# Patient Record
Sex: MALE | Race: WHITE | NOT HISPANIC OR LATINO | Employment: OTHER | URBAN - METROPOLITAN AREA
[De-identification: names, ages, dates, MRNs, and addresses within clinical notes are randomized per-mention and may not be internally consistent; named-entity substitution may affect disease eponyms.]

---

## 2017-01-20 ENCOUNTER — TRANSCRIBE ORDERS (OUTPATIENT)
Dept: ADMINISTRATIVE | Facility: HOSPITAL | Age: 62
End: 2017-01-20

## 2017-01-20 ENCOUNTER — ALLSCRIPTS OFFICE VISIT (OUTPATIENT)
Dept: OTHER | Facility: OTHER | Age: 62
End: 2017-01-20

## 2017-01-20 DIAGNOSIS — S46.912S STRAIN OF LEFT SHOULDER, SEQUELA: Primary | ICD-10-CM

## 2017-01-30 ENCOUNTER — GENERIC CONVERSION - ENCOUNTER (OUTPATIENT)
Dept: OTHER | Facility: OTHER | Age: 62
End: 2017-01-30

## 2017-01-30 ENCOUNTER — TRANSCRIBE ORDERS (OUTPATIENT)
Dept: LAB | Facility: CLINIC | Age: 62
End: 2017-01-30

## 2017-01-30 ENCOUNTER — APPOINTMENT (OUTPATIENT)
Dept: LAB | Facility: CLINIC | Age: 62
End: 2017-01-30
Payer: COMMERCIAL

## 2017-01-30 DIAGNOSIS — E11.65 TYPE 2 DIABETES MELLITUS WITH HYPERGLYCEMIA (HCC): ICD-10-CM

## 2017-01-30 LAB
ALBUMIN SERPL BCP-MCNC: 3.5 G/DL (ref 3.5–5)
ALP SERPL-CCNC: 72 U/L (ref 46–116)
ALT SERPL W P-5'-P-CCNC: 33 U/L (ref 12–78)
ANION GAP SERPL CALCULATED.3IONS-SCNC: 8 MMOL/L (ref 4–13)
AST SERPL W P-5'-P-CCNC: 13 U/L (ref 5–45)
BILIRUB SERPL-MCNC: 0.9 MG/DL (ref 0.2–1)
BUN SERPL-MCNC: 54 MG/DL (ref 5–25)
CALCIUM SERPL-MCNC: 8.9 MG/DL (ref 8.3–10.1)
CHLORIDE SERPL-SCNC: 100 MMOL/L (ref 100–108)
CHOLEST SERPL-MCNC: 72 MG/DL (ref 50–200)
CO2 SERPL-SCNC: 26 MMOL/L (ref 21–32)
CREAT SERPL-MCNC: 1.97 MG/DL (ref 0.6–1.3)
CREAT UR-MCNC: 99.1 MG/DL
ERYTHROCYTE [DISTWIDTH] IN BLOOD BY AUTOMATED COUNT: 12.3 % (ref 11.6–15.1)
EST. AVERAGE GLUCOSE BLD GHB EST-MCNC: 252 MG/DL
GFR SERPL CREATININE-BSD FRML MDRD: 34.5 ML/MIN/1.73SQ M
GLUCOSE SERPL-MCNC: 425 MG/DL (ref 65–140)
HBA1C MFR BLD: 10.4 % (ref 4.2–6.3)
HCT VFR BLD AUTO: 34.8 % (ref 36.5–49.3)
HDLC SERPL-MCNC: 43 MG/DL (ref 40–60)
HGB BLD-MCNC: 11.9 G/DL (ref 12–17)
LDLC SERPL CALC-MCNC: 3 MG/DL (ref 0–100)
MCH RBC QN AUTO: 32 PG (ref 26.8–34.3)
MCHC RBC AUTO-ENTMCNC: 34.2 G/DL (ref 31.4–37.4)
MCV RBC AUTO: 94 FL (ref 82–98)
MICROALBUMIN UR-MCNC: 31 MG/L (ref 0–20)
MICROALBUMIN/CREAT 24H UR: 31 MG/G CREATININE (ref 0–30)
PLATELET # BLD AUTO: 152 THOUSANDS/UL (ref 149–390)
PMV BLD AUTO: 11.3 FL (ref 8.9–12.7)
POTASSIUM SERPL-SCNC: 5.7 MMOL/L (ref 3.5–5.3)
PROT SERPL-MCNC: 7.1 G/DL (ref 6.4–8.2)
RBC # BLD AUTO: 3.72 MILLION/UL (ref 3.88–5.62)
SODIUM SERPL-SCNC: 134 MMOL/L (ref 136–145)
TRIGL SERPL-MCNC: 129 MG/DL
TSH SERPL DL<=0.05 MIU/L-ACNC: 1.83 UIU/ML (ref 0.36–3.74)
WBC # BLD AUTO: 5.6 THOUSAND/UL (ref 4.31–10.16)

## 2017-01-30 PROCEDURE — 80061 LIPID PANEL: CPT

## 2017-01-30 PROCEDURE — 82570 ASSAY OF URINE CREATININE: CPT

## 2017-01-30 PROCEDURE — 83036 HEMOGLOBIN GLYCOSYLATED A1C: CPT

## 2017-01-30 PROCEDURE — 84443 ASSAY THYROID STIM HORMONE: CPT

## 2017-01-30 PROCEDURE — 36415 COLL VENOUS BLD VENIPUNCTURE: CPT

## 2017-01-30 PROCEDURE — 80053 COMPREHEN METABOLIC PANEL: CPT

## 2017-01-30 PROCEDURE — 82043 UR ALBUMIN QUANTITATIVE: CPT

## 2017-01-30 PROCEDURE — 85027 COMPLETE CBC AUTOMATED: CPT

## 2017-02-04 ENCOUNTER — HOSPITAL ENCOUNTER (OUTPATIENT)
Dept: MRI IMAGING | Facility: HOSPITAL | Age: 62
Discharge: HOME | End: 2017-02-04
Attending: ORTHOPAEDIC SURGERY
Payer: COMMERCIAL

## 2017-02-04 DIAGNOSIS — S46.912S STRAIN OF LEFT SHOULDER, SEQUELA: ICD-10-CM

## 2017-02-04 PROCEDURE — 73221 MRI JOINT UPR EXTREM W/O DYE: CPT

## 2017-02-07 ENCOUNTER — ALLSCRIPTS OFFICE VISIT (OUTPATIENT)
Dept: OTHER | Facility: OTHER | Age: 62
End: 2017-02-07

## 2017-02-08 ENCOUNTER — APPOINTMENT (OUTPATIENT)
Dept: LAB | Facility: CLINIC | Age: 62
End: 2017-02-08
Payer: COMMERCIAL

## 2017-02-08 DIAGNOSIS — E78.5 HYPERLIPIDEMIA: ICD-10-CM

## 2017-02-08 LAB
ALBUMIN SERPL BCP-MCNC: 3.6 G/DL (ref 3.5–5)
ALP SERPL-CCNC: 70 U/L (ref 46–116)
ALT SERPL W P-5'-P-CCNC: 36 U/L (ref 12–78)
ANION GAP SERPL CALCULATED.3IONS-SCNC: 8 MMOL/L (ref 4–13)
AST SERPL W P-5'-P-CCNC: 17 U/L (ref 5–45)
BILIRUB SERPL-MCNC: 1.4 MG/DL (ref 0.2–1)
BUN SERPL-MCNC: 45 MG/DL (ref 5–25)
CALCIUM SERPL-MCNC: 9.4 MG/DL (ref 8.3–10.1)
CHLORIDE SERPL-SCNC: 101 MMOL/L (ref 100–108)
CO2 SERPL-SCNC: 28 MMOL/L (ref 21–32)
CREAT SERPL-MCNC: 1.77 MG/DL (ref 0.6–1.3)
GFR SERPL CREATININE-BSD FRML MDRD: 39 ML/MIN/1.73SQ M
GLUCOSE SERPL-MCNC: 256 MG/DL (ref 65–140)
POTASSIUM SERPL-SCNC: 4.9 MMOL/L (ref 3.5–5.3)
PROT SERPL-MCNC: 7 G/DL (ref 6.4–8.2)
SODIUM SERPL-SCNC: 137 MMOL/L (ref 136–145)

## 2017-02-08 PROCEDURE — 80053 COMPREHEN METABOLIC PANEL: CPT

## 2017-02-08 PROCEDURE — 36415 COLL VENOUS BLD VENIPUNCTURE: CPT

## 2017-02-10 ENCOUNTER — ALLSCRIPTS OFFICE VISIT (OUTPATIENT)
Dept: OTHER | Facility: OTHER | Age: 62
End: 2017-02-10

## 2017-02-10 DIAGNOSIS — M75.22 BICIPITAL TENDINITIS OF LEFT SHOULDER: ICD-10-CM

## 2017-02-10 DIAGNOSIS — M75.52 BURSITIS OF LEFT SHOULDER: ICD-10-CM

## 2017-02-10 DIAGNOSIS — R68.83 CHILLS (WITHOUT FEVER): ICD-10-CM

## 2017-02-14 ENCOUNTER — GENERIC CONVERSION - ENCOUNTER (OUTPATIENT)
Dept: OTHER | Facility: OTHER | Age: 62
End: 2017-02-14

## 2017-02-21 ENCOUNTER — HOSPITAL ENCOUNTER (OUTPATIENT)
Dept: RADIOLOGY | Age: 62
Discharge: HOME | End: 2017-02-21
Attending: NURSE PRACTITIONER | Admitting: NURSE PRACTITIONER
Payer: COMMERCIAL

## 2017-02-21 ENCOUNTER — APPOINTMENT (OUTPATIENT)
Dept: LAB | Age: 62
End: 2017-02-21
Attending: NURSE PRACTITIONER
Payer: COMMERCIAL

## 2017-02-21 ENCOUNTER — OFFICE VISIT (OUTPATIENT)
Dept: URGENT CARE | Age: 62
End: 2017-02-21
Payer: COMMERCIAL

## 2017-02-21 DIAGNOSIS — R68.83 CHILLS (WITHOUT FEVER): ICD-10-CM

## 2017-02-21 LAB — GLUCOSE SERPL-MCNC: 95 MG/DL (ref 65–140)

## 2017-02-21 PROCEDURE — 99213 OFFICE O/P EST LOW 20 MIN: CPT

## 2017-02-21 PROCEDURE — 87798 DETECT AGENT NOS DNA AMP: CPT

## 2017-02-21 PROCEDURE — 87070 CULTURE OTHR SPECIMN AEROBIC: CPT

## 2017-02-21 PROCEDURE — 87086 URINE CULTURE/COLONY COUNT: CPT

## 2017-02-21 PROCEDURE — 87430 STREP A AG IA: CPT | Performed by: FAMILY MEDICINE

## 2017-02-21 PROCEDURE — 82948 REAGENT STRIP/BLOOD GLUCOSE: CPT

## 2017-02-21 PROCEDURE — 71020 HB CHEST X-RAY 2VW FRONTAL&LATL: CPT

## 2017-02-22 LAB
BACTERIA UR CULT: NORMAL
FLUAV AG SPEC QL: NORMAL
FLUBV AG SPEC QL: NORMAL
RSV B RNA SPEC QL NAA+PROBE: NORMAL

## 2017-02-23 LAB — BACTERIA THROAT CULT: NORMAL

## 2017-03-08 ENCOUNTER — ALLSCRIPTS OFFICE VISIT (OUTPATIENT)
Dept: OTHER | Facility: OTHER | Age: 62
End: 2017-03-08

## 2017-03-24 ENCOUNTER — GENERIC CONVERSION - ENCOUNTER (OUTPATIENT)
Dept: OTHER | Facility: OTHER | Age: 62
End: 2017-03-24

## 2017-04-10 ENCOUNTER — ALLSCRIPTS OFFICE VISIT (OUTPATIENT)
Dept: OTHER | Facility: OTHER | Age: 62
End: 2017-04-10

## 2017-04-19 ENCOUNTER — ALLSCRIPTS OFFICE VISIT (OUTPATIENT)
Dept: OTHER | Facility: OTHER | Age: 62
End: 2017-04-19

## 2017-07-07 ENCOUNTER — GENERIC CONVERSION - ENCOUNTER (OUTPATIENT)
Dept: OTHER | Facility: OTHER | Age: 62
End: 2017-07-07

## 2017-07-28 ENCOUNTER — TRANSCRIBE ORDERS (OUTPATIENT)
Dept: LAB | Facility: CLINIC | Age: 62
End: 2017-07-28

## 2017-07-28 ENCOUNTER — APPOINTMENT (OUTPATIENT)
Dept: LAB | Facility: CLINIC | Age: 62
End: 2017-07-28
Payer: COMMERCIAL

## 2017-07-28 DIAGNOSIS — H35.372 LEFT EPIRETINAL MEMBRANE: ICD-10-CM

## 2017-07-28 DIAGNOSIS — H43.12 VITREOUS HEMORRHAGE OF LEFT EYE (HCC): ICD-10-CM

## 2017-07-28 DIAGNOSIS — H43.12 VITREOUS HEMORRHAGE OF LEFT EYE (HCC): Primary | ICD-10-CM

## 2017-07-28 LAB
ANION GAP SERPL CALCULATED.3IONS-SCNC: 9 MMOL/L (ref 4–13)
APTT PPP: 25 SECONDS (ref 23–35)
BASOPHILS # BLD AUTO: 0.03 THOUSANDS/ΜL (ref 0–0.1)
BASOPHILS NFR BLD AUTO: 0 % (ref 0–1)
BUN SERPL-MCNC: 45 MG/DL (ref 5–25)
CALCIUM SERPL-MCNC: 9.5 MG/DL (ref 8.3–10.1)
CHLORIDE SERPL-SCNC: 104 MMOL/L (ref 100–108)
CO2 SERPL-SCNC: 30 MMOL/L (ref 21–32)
CREAT SERPL-MCNC: 1.84 MG/DL (ref 0.6–1.3)
EOSINOPHIL # BLD AUTO: 0.19 THOUSAND/ΜL (ref 0–0.61)
EOSINOPHIL NFR BLD AUTO: 2 % (ref 0–6)
ERYTHROCYTE [DISTWIDTH] IN BLOOD BY AUTOMATED COUNT: 12.7 % (ref 11.6–15.1)
GFR SERPL CREATININE-BSD FRML MDRD: 38 ML/MIN/1.73SQ M
GLUCOSE SERPL-MCNC: 111 MG/DL (ref 65–140)
HCT VFR BLD AUTO: 39.6 % (ref 36.5–49.3)
HGB BLD-MCNC: 13.2 G/DL (ref 12–17)
INR PPP: 0.94 (ref 0.86–1.16)
LYMPHOCYTES # BLD AUTO: 1.88 THOUSANDS/ΜL (ref 0.6–4.47)
LYMPHOCYTES NFR BLD AUTO: 24 % (ref 14–44)
MCH RBC QN AUTO: 30.3 PG (ref 26.8–34.3)
MCHC RBC AUTO-ENTMCNC: 33.3 G/DL (ref 31.4–37.4)
MCV RBC AUTO: 91 FL (ref 82–98)
MONOCYTES # BLD AUTO: 0.73 THOUSAND/ΜL (ref 0.17–1.22)
MONOCYTES NFR BLD AUTO: 9 % (ref 4–12)
NEUTROPHILS # BLD AUTO: 5.08 THOUSANDS/ΜL (ref 1.85–7.62)
NEUTS SEG NFR BLD AUTO: 65 % (ref 43–75)
PLATELET # BLD AUTO: 244 THOUSANDS/UL (ref 149–390)
PMV BLD AUTO: 10.8 FL (ref 8.9–12.7)
POTASSIUM SERPL-SCNC: 4.8 MMOL/L (ref 3.5–5.3)
PROTHROMBIN TIME: 12.8 SECONDS (ref 12.1–14.4)
RBC # BLD AUTO: 4.35 MILLION/UL (ref 3.88–5.62)
SODIUM SERPL-SCNC: 143 MMOL/L (ref 136–145)
WBC # BLD AUTO: 7.91 THOUSAND/UL (ref 4.31–10.16)

## 2017-07-28 PROCEDURE — 85730 THROMBOPLASTIN TIME PARTIAL: CPT

## 2017-07-28 PROCEDURE — 80048 BASIC METABOLIC PNL TOTAL CA: CPT

## 2017-07-28 PROCEDURE — 85025 COMPLETE CBC W/AUTO DIFF WBC: CPT

## 2017-07-28 PROCEDURE — 85610 PROTHROMBIN TIME: CPT

## 2017-07-28 PROCEDURE — 36415 COLL VENOUS BLD VENIPUNCTURE: CPT

## 2017-10-17 ENCOUNTER — GENERIC CONVERSION - ENCOUNTER (OUTPATIENT)
Dept: OTHER | Facility: OTHER | Age: 62
End: 2017-10-17

## 2018-01-11 NOTE — RESULT NOTES
Verified Results  (1) COMPREHENSIVE METABOLIC PANEL [id number] 81:44YI Silvino Espinoza Order Number: [id number]     Test Name Result Flag Reference   GLUCOSE,RANDM 256 mg/dL H    If the patient is fasting, the ADA then defines impaired fasting glucose as > 100 mg/dL and diabetes as > or equal to 123 mg/dL  SODIUM 137 mmol/L  136-145   POTASSIUM 4 9 mmol/L  3 5-5 3   CHLORIDE 101 mmol/L  100-108   CARBON DIOXIDE 28 mmol/L  21-32   ANION GAP (CALC) 8 mmol/L  4-13   BLOOD UREA NITROGEN 45 mg/dL H 5-25   CREATININE 1 77 mg/dL H 0 60-1 30   Standardized to IDMS reference method   CALCIUM 9 4 mg/dL  8 3-10 1   BILI, TOTAL 1 40 mg/dL H 0 20-1 00   ALK PHOSPHATAS 70 U/L     ALT (SGPT) 36 U/L  12-78   AST(SGOT) 17 U/L  5-45   ALBUMIN 3 6 g/dL  3 5-5 0   TOTAL PROTEIN 7 0 g/dL  6 4-8 2   eGFR Non-African American 39 0 ml/min/1 73sq [facility] [employer] Disease Education Program recommendations are as follows:  GFR calculation is accurate only with a steady state creatinine  Chronic Kidney disease less than 60 ml/min/1 73 sq  meters  Kidney failure less than 15 ml/min/1 73 sq  meters         Discussion/Summary   stable, improved from last lab

## 2018-01-11 NOTE — RESULT NOTES
Message   i  left a message for patient to call back, his potassium is 5 7 and blood sugar 425, he should go to the ER as soon as possible, if he calls back please tell him to go to the ER,    and also please call him again  thanks      Verified Results  (1) CBC/ PLT (NO DIFF) [id number] 07:30AM Jana Rough Order Number: [id number]     Test Name Result Flag Reference   HEMATOCRIT 34 8 % L 36 5-49 3   HEMOGLOBIN 11 9 g/dL L 12 0-17 0   MCHC 34 2 g/dL  31 4-37 4   MCH 32 0 pg  26 8-34 3   MCV 94 fL  82-98   PLATELET COUNT 817 Thousands/uL  149-390   RBC COUNT 3 72 Million/uL L 3 88-5 62   RDW 12 3 %  11 6-15 1   WBC COUNT 5 60 Thousand/uL  4 31-10 16   MPV 11 3 fL  8 9-12 7     (1) COMPREHENSIVE METABOLIC PANEL [id number] 23:10PN Jana Rough Order Number: [id number]     Test Name Result Flag Reference   GLUCOSE,RANDM 425 mg/dL H    If the patient is fasting, the ADA then defines impaired fasting glucose as > 100 mg/dL and diabetes as > or equal to 123 mg/dL  SODIUM 134 mmol/L L 136-145   POTASSIUM 5 7 mmol/L H 3 5-5 3   CHLORIDE 100 mmol/L  100-108   CARBON DIOXIDE 26 mmol/L  21-32   ANION GAP (CALC) 8 mmol/L  4-13   BLOOD UREA NITROGEN 54 mg/dL H 5-25   CREATININE 1 97 mg/dL H 0 60-1 30   Standardized to IDMS reference method   CALCIUM 8 9 mg/dL  8 3-10 1   BILI, TOTAL 0 90 mg/dL  0 20-1 00   ALK PHOSPHATAS 72 U/L     ALT (SGPT) 33 U/L  12-78   AST(SGOT) 13 U/L  5-45   ALBUMIN 3 5 g/dL  3 5-5 0   TOTAL PROTEIN 7 1 g/dL  6 4-8 2   eGFR Non-African American 34 5 ml/min/1 73sq m     - Patient Instructions: This is a fasting blood test  Water,black tea or black  coffee only after 9:00pm the night before test Drink 2 glasses of water the morning of test - Patient Instructions: This bloodwork is non-fasting  Please drink two glasses of   water morning of bloodwork    National Kidney Disease Education Program recommendations are as follows:  GFR calculation is accurate only with a steady state creatinine  Chronic Kidney disease less than 60 ml/min/1 73 sq  meters  Kidney failure less than 15 ml/min/1 73 sq  meters  (1) HEMOGLOBIN A1C 30Jan2017 07:30AM Jesús Hu Order Number: [id number]     Test Name Result Flag Reference   HEMOGLOBIN A1C 10 4 % H 4 2-6 3   EST  AVG  GLUCOSE 252 mg/dl       (1) LIPID PANEL, FASTING 30Jan2017 07:30AM Jesús Hu Order Number: [id number]     Test Name Result Flag Reference   CHOLESTEROL 72 mg/dL     HDL,DIRECT 43 mg/dL  40-60   Specimen collection should occur prior to Metamizole administration due to the potential for falsely depressed results  LDL CHOLESTEROL CALCULATED 3 mg/dL  0-100   - Patient Instructions: This is a fasting blood test  Water,black tea or black  coffee only after 9:00pm the night before test   Drink 2 glasses of water the morning of test     - Patient Instructions: This is a fasting blood test  Water,black tea or black  coffee only after 9:00pm the night before test Drink 2 glasses of water the morning of test - Patient Instructions: This bloodwork is non-fasting  Please drink two glasses of   water morning of bloodwork  Triglyceride:         Normal              <150 mg/dl       Borderline High    150-199 mg/dl       High               200-499 mg/dl       Very High          >499 mg/dl  Cholesterol:         Desirable        <200 mg/dl      Borderline High  200-239 mg/dl      High             >239 mg/dl  HDL Cholesterol:        High    >59 mg/dL      Low     <41 mg/dL  LDL CALCULATED:    This screening LDL is a calculated result  It does not have the accuracy of the Direct Measured LDL in the monitoring of patients with hyperlipidemia and/or statin therapy  Direct Measure LDL (DTC532) must be ordered separately in these patients     TRIGLYCERIDES 129 mg/dL  <=150   Specimen collection should occur prior to N-Acetylcysteine or Metamizole administration due to the potential for falsely depressed results  (1) MICROALBUMIN CREATININE RATIO, RANDOM URINE 30Jan2017 07:30AM Boond Order Number: [id number]     Test Name Result Flag Reference   MICROALBUMIN/ CREAT R 31 mg/g creatinine H 0-30   MICROALBUMIN,URINE 31 0 mg/L H 0 0-20 0   CREATININE URINE 99 1 mg/dL       (1) TSH [id number] 07:30AM Boond Order Number: [id number]     Test Name Result Flag Reference   TSH 1 830 uIU/mL  0 358-3 740   - Patient Instructions: This bloodwork is non-fasting  Please drink two glasses of water morning of bloodwork  - Patient Instructions: This is a fasting blood test  Water,black tea or black  coffee only after 9:00pm the night before test Drink 2 glasses of water the morning of test - Patient Instructions: This bloodwork is non-fasting  Please drink two glasses of   water morning of bloodwork  Patients undergoing fluorescein dye angiography may retain small amounts of fluorescein in the body for 48-72 hours post procedure  Samples containing fluorescein can produce falsely depressed TSH values  If the patient had this procedure,a specimen should be resubmitted post fluorescein clearance

## 2018-01-12 VITALS
HEIGHT: 68 IN | DIASTOLIC BLOOD PRESSURE: 58 MMHG | SYSTOLIC BLOOD PRESSURE: 97 MMHG | BODY MASS INDEX: 26.67 KG/M2 | HEART RATE: 69 BPM | WEIGHT: 176 LBS

## 2018-01-13 VITALS
HEART RATE: 73 BPM | BODY MASS INDEX: 26.67 KG/M2 | WEIGHT: 176 LBS | HEIGHT: 68 IN | RESPIRATION RATE: 16 BRPM | SYSTOLIC BLOOD PRESSURE: 110 MMHG | DIASTOLIC BLOOD PRESSURE: 68 MMHG

## 2018-01-13 VITALS
HEART RATE: 77 BPM | WEIGHT: 183 LBS | SYSTOLIC BLOOD PRESSURE: 112 MMHG | BODY MASS INDEX: 27.74 KG/M2 | HEIGHT: 68 IN | DIASTOLIC BLOOD PRESSURE: 64 MMHG

## 2018-01-13 VITALS
HEART RATE: 85 BPM | SYSTOLIC BLOOD PRESSURE: 111 MMHG | DIASTOLIC BLOOD PRESSURE: 66 MMHG | WEIGHT: 176 LBS | BODY MASS INDEX: 26.67 KG/M2 | HEIGHT: 68 IN

## 2018-01-14 VITALS
WEIGHT: 177 LBS | HEART RATE: 69 BPM | SYSTOLIC BLOOD PRESSURE: 103 MMHG | HEIGHT: 68 IN | DIASTOLIC BLOOD PRESSURE: 60 MMHG | BODY MASS INDEX: 26.83 KG/M2

## 2018-01-14 VITALS
SYSTOLIC BLOOD PRESSURE: 120 MMHG | BODY MASS INDEX: 28.04 KG/M2 | WEIGHT: 185 LBS | HEIGHT: 68 IN | DIASTOLIC BLOOD PRESSURE: 64 MMHG | HEART RATE: 61 BPM
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
## 2018-01-18 NOTE — RESULT NOTES
Verified Results  VAS KYRA WITH EXERCISE STUDY 01WYC5772 03:20PM Ángela Swan Order Number: [id number]    - Patient Instructions: To schedule this appointment, please contact Central Scheduling at [phone]   Order Number: [id number]    - Patient Instructions: To schedule this appointment, please contact Central Scheduling at [phone]  Test Name Result Flag Reference   VAS KYRA WITH EXERCISE STUDY (Report)     [facility] REPORT   CLINICAL:   Indications: PVD, Unspecified [I73 9]  Atherosclerosis with Claudication   [I70 219]  Patient complains of bilateral leg claudication  He stated that it   feels like he is being squeezed  1  CAD S/P percutaneous coronary angioplasty (414 01,V45 82) (I25 10,Z98 61)   2  Chronic kidney disease, stage 3 (585 3) (N18 3)   3  Benign essential hypertension (401 1) (I10)   4  Chronic systolic CHF (congestive heart failure), NYHA class 1 (428 22,428 0)   (I50 22)   5  Chronic combined systolic and diastolic CHF (congestive heart failure)   (428 42,428 0)   (I50 42)   6  Diabetes mellitus type II, uncontrolled (250 02) (E11 65)   7  Diabetic nephropathy associated with type 2 diabetes mellitus   (250 40,583 81) (E11 21)   8  Altered sensation of foot (782 0) (R20 9)   Operative History   Angioplasty   Risk Factors: The patient has history of HTN, diabetes (diabetes), renal disease   and CAD  Clinical   Right Pressure: 135/ mm Hg, Left Pressure: 117/ mm Hg  FINDINGS:      Segment    Right      Left               Pressure  KYRA Pressure  KYRA    Ant  Tibial    163 1 21    142 1 05    Post  Tibial    173 1 28    150 1 11    Metatarsal     255 1 89    120 0 89    Great Toe      84 0 62    61 0 45             CONCLUSION:   Impression:   Exercise: Toe raises   Duration: Completed 1 minute 30 seconds and was terminated due to patient   tiredness  Right Lower Limb   The ankle brachial index is normal at rest and with exercise     Ankle Pressure: 173 mm Hg , KYRA: 1 28  Immediately post exercise: 0 98   Right Metatarsal Pressure: 255 mm Hg   Right Great Toe Pressure 84 mm Hg , within the healing range  Left Lower Limb   The ankle brachial index is normal at rest and with exercise  Ankle Pressure 150 mm Hg , KYRA: 1 11 Immediately post exercise: 0 95   Left Metatarsal Pressure: 120 mm Hg  Left Great Toe Pressure 61 mm Hg , within the healing range        SIGNATURE:   Electronically Signed by: Zulema Sutton MD, RPVI on 2016-11-12 12:27:46 PM       Discussion/Summary   normal report
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
## 2018-02-23 ENCOUNTER — TELEPHONE (OUTPATIENT)
Dept: CARDIOLOGY CLINIC | Facility: CLINIC | Age: 63
End: 2018-02-23

## 2018-02-23 NOTE — TELEPHONE ENCOUNTER
Pre-op completed for [place] eye surgery @ [facility] faxed to [phone] and JESSICA

## 2018-06-12 ENCOUNTER — HOSPITAL ENCOUNTER (INPATIENT)
Facility: HOSPITAL | Age: 63
LOS: 2 days | Discharge: HOME HEALTH | DRG: 291 | End: 2018-06-14
Attending: EMERGENCY MEDICINE | Admitting: INTERNAL MEDICINE
Payer: COMMERCIAL

## 2018-06-12 ENCOUNTER — APPOINTMENT (EMERGENCY)
Dept: RADIOLOGY | Facility: HOSPITAL | Age: 63
DRG: 291 | End: 2018-06-12
Payer: COMMERCIAL

## 2018-06-12 ENCOUNTER — APPOINTMENT (INPATIENT)
Dept: ULTRASOUND IMAGING | Facility: HOSPITAL | Age: 63
DRG: 291 | End: 2018-06-12
Payer: COMMERCIAL

## 2018-06-12 DIAGNOSIS — L03.116 CELLULITIS OF LEFT LEG: ICD-10-CM

## 2018-06-12 DIAGNOSIS — N18.9 ACUTE KIDNEY INJURY SUPERIMPOSED ON CHRONIC KIDNEY DISEASE (HCC): Chronic | ICD-10-CM

## 2018-06-12 DIAGNOSIS — L03.116 LEFT LEG CELLULITIS: ICD-10-CM

## 2018-06-12 DIAGNOSIS — N17.9 ACUTE KIDNEY INJURY SUPERIMPOSED ON CHRONIC KIDNEY DISEASE (HCC): Chronic | ICD-10-CM

## 2018-06-12 DIAGNOSIS — E78.5 HYPERLIPIDEMIA: Chronic | ICD-10-CM

## 2018-06-12 DIAGNOSIS — R07.9 CHEST PAIN: ICD-10-CM

## 2018-06-12 DIAGNOSIS — I50.9 CHF EXACERBATION (HCC): Primary | ICD-10-CM

## 2018-06-12 DIAGNOSIS — I50.43 ACUTE ON CHRONIC COMBINED SYSTOLIC AND DIASTOLIC CHF (CONGESTIVE HEART FAILURE) (HCC): ICD-10-CM

## 2018-06-12 PROBLEM — K21.9 GERD (GASTROESOPHAGEAL REFLUX DISEASE): Status: ACTIVE | Noted: 2018-06-12

## 2018-06-12 LAB
ALBUMIN SERPL BCP-MCNC: 3.6 G/DL (ref 3.5–5)
ALP SERPL-CCNC: 86 U/L (ref 46–116)
ALT SERPL W P-5'-P-CCNC: 33 U/L (ref 12–78)
ANION GAP SERPL CALCULATED.3IONS-SCNC: 11 MMOL/L (ref 4–13)
APTT PPP: 30 SECONDS (ref 24–36)
AST SERPL W P-5'-P-CCNC: 19 U/L (ref 5–45)
BASOPHILS # BLD AUTO: 0.05 THOUSANDS/ΜL (ref 0–0.1)
BASOPHILS NFR BLD AUTO: 1 % (ref 0–1)
BILIRUB SERPL-MCNC: 1.2 MG/DL (ref 0.2–1)
BUN SERPL-MCNC: 39 MG/DL (ref 5–25)
CALCIUM SERPL-MCNC: 9.1 MG/DL (ref 8.3–10.1)
CHLORIDE SERPL-SCNC: 104 MMOL/L (ref 100–108)
CO2 SERPL-SCNC: 24 MMOL/L (ref 21–32)
CREAT SERPL-MCNC: 2.01 MG/DL (ref 0.6–1.3)
EOSINOPHIL # BLD AUTO: 0.2 THOUSAND/ΜL (ref 0–0.61)
EOSINOPHIL NFR BLD AUTO: 3 % (ref 0–6)
ERYTHROCYTE [DISTWIDTH] IN BLOOD BY AUTOMATED COUNT: 14.7 % (ref 11.6–15.1)
GFR SERPL CREATININE-BSD FRML MDRD: 34 ML/MIN/1.73SQ M
GLUCOSE SERPL-MCNC: 104 MG/DL (ref 65–140)
HCT VFR BLD AUTO: 35 % (ref 36.5–49.3)
HGB BLD-MCNC: 11.7 G/DL (ref 12–17)
INR PPP: 0.97 (ref 0.86–1.17)
LACTATE SERPL-SCNC: 2.6 MMOL/L (ref 0.5–2)
LYMPHOCYTES # BLD AUTO: 1.13 THOUSANDS/ΜL (ref 0.6–4.47)
LYMPHOCYTES NFR BLD AUTO: 14 % (ref 14–44)
MCH RBC QN AUTO: 31 PG (ref 26.8–34.3)
MCHC RBC AUTO-ENTMCNC: 33.4 G/DL (ref 31.4–37.4)
MCV RBC AUTO: 93 FL (ref 82–98)
MONOCYTES # BLD AUTO: 0.71 THOUSAND/ΜL (ref 0.17–1.22)
MONOCYTES NFR BLD AUTO: 9 % (ref 4–12)
NEUTROPHILS # BLD AUTO: 5.98 THOUSANDS/ΜL (ref 1.85–7.62)
NEUTS SEG NFR BLD AUTO: 74 % (ref 43–75)
NT-PROBNP SERPL-MCNC: 4974 PG/ML
PLATELET # BLD AUTO: 232 THOUSANDS/UL (ref 149–390)
PLATELET # BLD AUTO: 263 THOUSANDS/UL (ref 149–390)
PMV BLD AUTO: 10.2 FL (ref 8.9–12.7)
PMV BLD AUTO: 9.8 FL (ref 8.9–12.7)
POTASSIUM SERPL-SCNC: 4.1 MMOL/L (ref 3.5–5.3)
PROT SERPL-MCNC: 8.5 G/DL (ref 6.4–8.2)
PROTHROMBIN TIME: 12.6 SECONDS (ref 11.8–14.2)
RBC # BLD AUTO: 3.78 MILLION/UL (ref 3.88–5.62)
SODIUM SERPL-SCNC: 139 MMOL/L (ref 136–145)
TROPONIN I SERPL-MCNC: <0.02 NG/ML
TROPONIN I SERPL-MCNC: <0.02 NG/ML
WBC # BLD AUTO: 8.07 THOUSAND/UL (ref 4.31–10.16)

## 2018-06-12 PROCEDURE — 83880 ASSAY OF NATRIURETIC PEPTIDE: CPT | Performed by: EMERGENCY MEDICINE

## 2018-06-12 PROCEDURE — 76705 ECHO EXAM OF ABDOMEN: CPT

## 2018-06-12 PROCEDURE — 80053 COMPREHEN METABOLIC PANEL: CPT | Performed by: EMERGENCY MEDICINE

## 2018-06-12 PROCEDURE — 93005 ELECTROCARDIOGRAM TRACING: CPT

## 2018-06-12 PROCEDURE — 85025 COMPLETE CBC W/AUTO DIFF WBC: CPT | Performed by: EMERGENCY MEDICINE

## 2018-06-12 PROCEDURE — 87186 SC STD MICRODIL/AGAR DIL: CPT | Performed by: EMERGENCY MEDICINE

## 2018-06-12 PROCEDURE — 87040 BLOOD CULTURE FOR BACTERIA: CPT | Performed by: EMERGENCY MEDICINE

## 2018-06-12 PROCEDURE — 71046 X-RAY EXAM CHEST 2 VIEWS: CPT

## 2018-06-12 PROCEDURE — 87205 SMEAR GRAM STAIN: CPT | Performed by: EMERGENCY MEDICINE

## 2018-06-12 PROCEDURE — 83605 ASSAY OF LACTIC ACID: CPT | Performed by: EMERGENCY MEDICINE

## 2018-06-12 PROCEDURE — 99285 EMERGENCY DEPT VISIT HI MDM: CPT

## 2018-06-12 PROCEDURE — 87070 CULTURE OTHR SPECIMN AEROBIC: CPT | Performed by: EMERGENCY MEDICINE

## 2018-06-12 PROCEDURE — 99223 1ST HOSP IP/OBS HIGH 75: CPT | Performed by: INTERNAL MEDICINE

## 2018-06-12 PROCEDURE — 87077 CULTURE AEROBIC IDENTIFY: CPT | Performed by: EMERGENCY MEDICINE

## 2018-06-12 PROCEDURE — 85610 PROTHROMBIN TIME: CPT | Performed by: EMERGENCY MEDICINE

## 2018-06-12 PROCEDURE — 84484 ASSAY OF TROPONIN QUANT: CPT | Performed by: PHYSICIAN ASSISTANT

## 2018-06-12 PROCEDURE — 96365 THER/PROPH/DIAG IV INF INIT: CPT

## 2018-06-12 PROCEDURE — 85730 THROMBOPLASTIN TIME PARTIAL: CPT | Performed by: EMERGENCY MEDICINE

## 2018-06-12 PROCEDURE — 36415 COLL VENOUS BLD VENIPUNCTURE: CPT | Performed by: EMERGENCY MEDICINE

## 2018-06-12 PROCEDURE — 84484 ASSAY OF TROPONIN QUANT: CPT | Performed by: EMERGENCY MEDICINE

## 2018-06-12 PROCEDURE — 85049 AUTOMATED PLATELET COUNT: CPT | Performed by: PHYSICIAN ASSISTANT

## 2018-06-12 PROCEDURE — 96375 TX/PRO/DX INJ NEW DRUG ADDON: CPT

## 2018-06-12 RX ORDER — CALCIUM CARBONATE 200(500)MG
1000 TABLET,CHEWABLE ORAL DAILY PRN
Status: DISCONTINUED | OUTPATIENT
Start: 2018-06-12 | End: 2018-06-14 | Stop reason: HOSPADM

## 2018-06-12 RX ORDER — AMLODIPINE BESYLATE 5 MG/1
5 TABLET ORAL DAILY
Status: DISCONTINUED | OUTPATIENT
Start: 2018-06-13 | End: 2018-06-14 | Stop reason: HOSPADM

## 2018-06-12 RX ORDER — ONDANSETRON 2 MG/ML
4 INJECTION INTRAMUSCULAR; INTRAVENOUS EVERY 6 HOURS PRN
Status: DISCONTINUED | OUTPATIENT
Start: 2018-06-12 | End: 2018-06-14 | Stop reason: HOSPADM

## 2018-06-12 RX ORDER — FERROUS SULFATE 325(65) MG
325 TABLET ORAL
COMMUNITY

## 2018-06-12 RX ORDER — AMLODIPINE BESYLATE 5 MG/1
5 TABLET ORAL DAILY
COMMUNITY

## 2018-06-12 RX ORDER — SENNOSIDES 8.6 MG
1 TABLET ORAL DAILY
Status: DISCONTINUED | OUTPATIENT
Start: 2018-06-13 | End: 2018-06-14 | Stop reason: HOSPADM

## 2018-06-12 RX ORDER — OMEPRAZOLE 20 MG/1
20 CAPSULE, DELAYED RELEASE ORAL DAILY
COMMUNITY

## 2018-06-12 RX ORDER — CEPHALEXIN 500 MG/1
500 CAPSULE ORAL EVERY 6 HOURS SCHEDULED
Status: ON HOLD | COMMUNITY
Start: 2018-06-06 | End: 2018-06-14

## 2018-06-12 RX ORDER — FERROUS SULFATE 325(65) MG
325 TABLET ORAL
Status: DISCONTINUED | OUTPATIENT
Start: 2018-06-13 | End: 2018-06-14 | Stop reason: HOSPADM

## 2018-06-12 RX ORDER — ASPIRIN 325 MG
325 TABLET ORAL ONCE
Status: COMPLETED | OUTPATIENT
Start: 2018-06-12 | End: 2018-06-12

## 2018-06-12 RX ORDER — POTASSIUM CHLORIDE 750 MG/1
10 TABLET, EXTENDED RELEASE ORAL DAILY
Status: DISCONTINUED | OUTPATIENT
Start: 2018-06-13 | End: 2018-06-13

## 2018-06-12 RX ORDER — FUROSEMIDE 10 MG/ML
80 INJECTION INTRAMUSCULAR; INTRAVENOUS ONCE
Status: COMPLETED | OUTPATIENT
Start: 2018-06-12 | End: 2018-06-12

## 2018-06-12 RX ORDER — ASPIRIN 81 MG/1
81 TABLET, CHEWABLE ORAL DAILY
Status: DISCONTINUED | OUTPATIENT
Start: 2018-06-13 | End: 2018-06-14 | Stop reason: HOSPADM

## 2018-06-12 RX ORDER — FUROSEMIDE 10 MG/ML
80 INJECTION INTRAMUSCULAR; INTRAVENOUS
Status: DISCONTINUED | OUTPATIENT
Start: 2018-06-13 | End: 2018-06-13

## 2018-06-12 RX ORDER — PANTOPRAZOLE SODIUM 40 MG/1
40 TABLET, DELAYED RELEASE ORAL
Status: DISCONTINUED | OUTPATIENT
Start: 2018-06-13 | End: 2018-06-14 | Stop reason: HOSPADM

## 2018-06-12 RX ADMIN — ASPIRIN 325 MG: 325 TABLET ORAL at 17:40

## 2018-06-12 RX ADMIN — CEFAZOLIN SODIUM 2000 MG: 2 SOLUTION INTRAVENOUS at 16:01

## 2018-06-12 RX ADMIN — FUROSEMIDE 80 MG: 10 INJECTION, SOLUTION INTRAMUSCULAR; INTRAVENOUS at 17:42

## 2018-06-12 RX ADMIN — METOPROLOL TARTRATE 12.5 MG: 25 TABLET ORAL at 21:32

## 2018-06-12 NOTE — ED PROVIDER NOTES
History  Chief Complaint   Patient presents with    Leg Pain     pt c/o leg pain swelling and hot to touch, left leg shin area  pt c/o SOB as well able to speak in full sentences     13-year-old male presents to the emergency department relating my legs have been swollen for a couple of weeks on and off "  He tells me that the left leg became infected    He relates that he completed 2 rounds of antibiotics-a Z-Baron which ended a little over week ago and subsequently cephalexin 500 mg t i d  (started on June 6th and schedule to end on June 14th)  He relates that the visiting nurse today expressed concern that the area was warm  He relates that the area is much more sensitive to the touch than it has been as well  He tells me he is also short of breath  He explains that he has difficulty saying exactly when this started as he has been limiting his activity due to what he believes is claudication) pain in the legs when active)  He notices the shortness of breath with activity not while at rest   Just a small amount of activity however exacerbates his symptoms  He estimates that this has been ongoing for approximately a week  He has additionally noticed some discomfort in the left anterior chest yesterday and today  This occurs when I am out of breath   He describes the sensation as between a pressure and tightness    This lasts for several minutes and then improves  He has additionally had a cough (very infrequent) over the last week  Today is the 1st day he has had a bit of clear sputum  Patient uncertain as to whether not he has had any fevers  He denies having had any nausea or vomiting  He notes that his abdomen has seemed distended and he thinks that this may be restricting his breathing some  He notes that his weight is usually in the upper 170s  The last T was there was a couple of months ago  He relates that he had been pretty stable at 187 6 until today when he increased to 189    Patient relates that a couple of weeks ago when his legs began swelling that his PCP increased his 1st my to 80 mg b i d   Patient has not noticed much improvement and slides with this  He relates that he did stop this medication yesterday concerned it might be making him feel poorly overall  He notes that he does feel a bit better overall today  Past medical history significant for hypertension, diabetes and CAD  Patient has undergone CABG  He does have history of CHF and describes this having afflicted him around the time of his CABG  He has had wounds on his feet as well as on his legs  Foot wounds have been improving  Prior to Admission Medications   Prescriptions Last Dose Informant Patient Reported? Taking?    B Complex Vitamins (VITAMIN B COMPLEX PO)  Self Yes Yes   Sig: Take by mouth   Insulin Aspart (NOVOLOG SC)  Self Yes Yes   Sig: Inject under the skin   Insulin Glargine (LANTUS SOLOSTAR) 100 UNIT/ML SOPN  Self Yes Yes   Sig: Inject under the skin   Multiple Vitamin (ONE-A-DAY MENS PO)  Self Yes Yes   Sig: Take by mouth daily   amLODIPine (NORVASC) 5 mg tablet  Self Yes Yes   Sig: Take 5 mg by mouth daily   aspirin 81 MG tablet  Self Yes Yes   Sig: Take by mouth   cephalexin (KEFLEX) 500 mg capsule  Self Yes Yes   Sig: Take 500 mg by mouth every 6 (six) hours   ferrous sulfate 325 (65 Fe) mg tablet  Self Yes Yes   Sig: Take 325 mg by mouth daily with breakfast   furosemide (LASIX) 40 mg tablet  Self Yes Yes   Sig: Take by mouth 2 (two) times a day     metFORMIN (GLUCOPHAGE) 500 mg tablet  Self Yes Yes   Sig: Take 500 mg by mouth 2 (two) times a day with meals   metoprolol tartrate (LOPRESSOR) 25 mg tablet  Self Yes Yes   Sig: Take 12 5 mg by mouth every 12 (twelve) hours     omeprazole (PriLOSEC) 20 mg delayed release capsule  Self Yes Yes   Sig: Take 20 mg by mouth 2 (two) times a day   potassium chloride (K-DUR,KLOR-CON) 10 mEq tablet  Self Yes Yes   Sig: Take by mouth Facility-Administered Medications: None       Past Medical History:   Diagnosis Date    Diabetes mellitus ([facility] [address] )     Hypertension     Wound, open, foot        Past Surgical History:   Procedure Laterality Date    CORONARY ARTERY BYPASS GRAFT      x4 stents     DENTAL SURGERY      implant    RHINOPLASTY         History reviewed  No pertinent family history  I have reviewed and agree with the history as documented  Social History   Substance Use Topics    Smoking status: Never Smoker    Smokeless tobacco: Never Used    Alcohol use No        Review of Systems   Constitutional: Positive for fatigue  Negative for fever  All other systems reviewed and are negative  Physical Exam  Physical Exam   Constitutional: He is oriented to person, place, and time  He appears well-developed and well-nourished  Patient appears comfortable on initial assessment  He was tachypneic with accessory muscle use on respiration following change out shirt and pants and into gown  Eyes: Conjunctivae and EOM are normal    Cardiovascular: Normal rate and regular rhythm  Pulses:       Dorsalis pedis pulses are 2+ on the left side  Pulmonary/Chest:   Respiratory effort within normal limits at rest   Diminished absent breath sounds in the lower lung fields  No rales, rhonchi or wheezes appreciated  Abdominal: Soft  Bowel sounds are normal  He exhibits distension  Musculoskeletal: Normal range of motion  No wounds on the right foot  There is a large callus with central open wound over the plantar aspect of the left distal foot  Wound is approximately 1 5 x 1 cm in diameter and 7 mm deep  No drainage, discoloration or tenderness around this  There is mild ulceration over the anterior aspect of the right shin  No surrounding erythema or tenderness  Right calf is nontender  Both lower legs with 2+ edema  Left shin with scabbed areas of ulceration  Small amount of purulent discharge from this    Erythema with mild increased warmth surrounding wounds affecting majority of the right anterior lower leg  A horizontal line was drawn near the upper aspect of the anterior lower leg and patient relates that this was placed on Friday (4 days ago) erythema is 2 cm lower than this site (improved from that at time of placement)  Erythematous region is exquisitely tender to the touch  Neurological: He is alert and oriented to person, place, and time  Skin: Skin is warm and dry  Psychiatric: He has a normal mood and affect  His behavior is normal    Nursing note and vitals reviewed        Vital Signs  ED Triage Vitals [06/12/18 1452]   Temperature Pulse Respirations Blood Pressure SpO2   97 8 °F (36 6 °C) 73 18 158/74 95 %      Temp Source Heart Rate Source Patient Position - Orthostatic VS BP Location FiO2 (%)   Oral Monitor Sitting Left arm --      Pain Score       8           Vitals:    06/12/18 1730 06/12/18 1800 06/12/18 1900 06/12/18 2300   BP: 163/77 165/79 160/74 130/61   Pulse: 74 76 73 71   Patient Position - Orthostatic VS:   Sitting Lying       Visual Acuity      ED Medications  Medications   ceFAZolin (ANCEF) IVPB (premix) 1,000 mg (not administered)   furosemide (LASIX) injection 80 mg (not administered)   amLODIPine (NORVASC) tablet 5 mg (not administered)   aspirin chewable tablet 81 mg (not administered)   ferrous sulfate tablet 325 mg (not administered)   metoprolol tartrate (LOPRESSOR) partial tablet 12 5 mg (12 5 mg Oral Given 6/12/18 2132)   pantoprazole (PROTONIX) EC tablet 40 mg (not administered)   potassium chloride (K-DUR,KLOR-CON) CR tablet 10 mEq (not administered)   ondansetron (ZOFRAN) injection 4 mg (not administered)   senna (SENOKOT) tablet 8 6 mg (not administered)   calcium carbonate (TUMS) chewable tablet 1,000 mg (not administered)   enoxaparin (LOVENOX) subcutaneous injection 40 mg (not administered)   ceFAZolin (ANCEF) IVPB (premix) 2,000 mg (0 mg Intravenous Stopped 6/12/18 1631) aspirin tablet 325 mg (325 mg Oral Given 6/12/18 1740)   furosemide (LASIX) injection 80 mg (80 mg Intravenous Given 6/12/18 1742)       Diagnostic Studies  Results Reviewed     Procedure Component Value Units Date/Time    Wound culture and Gram stain [[id number]] Collected:  06/12/18 1552    Lab Status:  Preliminary result Specimen:  Wound from Leg, Left Updated:  06/12/18 2110     Gram Stain Result Rare Polys      No bacteria seen    Lactic acid, plasma [[id number]]  (Abnormal) Collected:  06/12/18 1559    Lab Status:  Final result Specimen:  Blood from Arm, Left Updated:  06/12/18 1655     LACTIC ACID 2 6 (HH) mmol/L     Narrative:         Result may be elevated if tourniquet was used during collection  B-type natriuretic peptide [[id number]]  (Abnormal) Collected:  06/12/18 1559    Lab Status:  Final result Specimen:  Blood from Arm, Left Updated:  06/12/18 1639     NT-proBNP 4,974 (H) pg/mL     Troponin I [[id number]]  (Normal) Collected:  06/12/18 1559    Lab Status:  Final result Specimen:  Blood from Arm, Left Updated:  06/12/18 1634     Troponin I <0 02 ng/mL     Narrative:         [employer] Chemistry analyzer 99% cutoff is > 0 04 ng/mL in network labs    o cTnI 99% cutoff is useful only when applied to patients in the clinical setting of myocardial ischemia  o cTnI 99% cutoff should be interpreted in the context of clinical history, ECG findings and possibly cardiac imaging to establish correct diagnosis  o cTnI 99% cutoff may be suggestive but clearly not indicative of a coronary event without the clinical setting of myocardial ischemia      Comprehensive metabolic panel [[id number]]  (Abnormal) Collected:  06/12/18 1559    Lab Status:  Final result Specimen:  Blood from Arm, Left Updated:  06/12/18 1632     Sodium 139 mmol/L      Potassium 4 1 mmol/L      Chloride 104 mmol/L      CO2 24 mmol/L      Anion Gap 11 mmol/L      BUN 39 (H) mg/dL      Creatinine 2 01 (H) mg/dL      Glucose 104 mg/dL      Calcium 9 1 mg/dL      AST 19 U/L      ALT 33 U/L      Alkaline Phosphatase 86 U/L      Total Protein 8 5 (H) g/dL      Albumin 3 6 g/dL      Total Bilirubin 1 20 (H) mg/dL      eGFR 34 ml/min/1 73sq m     Narrative:         National Kidney Disease Education Program recommendations are as follows:  GFR calculation is accurate only with a steady state creatinine  Chronic Kidney disease less than 60 ml/min/1 73 sq  meters  Kidney failure less than 15 ml/min/1 73 sq  meters  Protime-INR [[id number]]  (Normal) Collected:  06/12/18 1559    Lab Status:  Final result Specimen:  Blood from Arm, Left Updated:  06/12/18 1627     Protime 12 6 seconds      INR 0 97    APTT [[id number]]  (Normal) Collected:  06/12/18 1559    Lab Status:  Final result Specimen:  Blood from Arm, Left Updated:  06/12/18 1627     PTT 30 seconds     CBC and differential [[id number]]  (Abnormal) Collected:  06/12/18 1559    Lab Status:  Final result Specimen:  Blood from Arm, Left Updated:  06/12/18 1616     WBC 8 07 Thousand/uL      RBC 3 78 (L) Million/uL      Hemoglobin 11 7 (L) g/dL      Hematocrit 35 0 (L) %      MCV 93 fL      MCH 31 0 pg      MCHC 33 4 g/dL      RDW 14 7 %      MPV 10 2 fL      Platelets 288 Thousands/uL      Neutrophils Relative 74 %      Lymphocytes Relative 14 %      Monocytes Relative 9 %      Eosinophils Relative 3 %      Basophils Relative 1 %      Neutrophils Absolute 5 98 Thousands/µL      Lymphocytes Absolute 1 13 Thousands/µL      Monocytes Absolute 0 71 Thousand/µL      Eosinophils Absolute 0 20 Thousand/µL      Basophils Absolute 0 05 Thousands/µL     Blood culture #1 [[id number]] Collected:  06/12/18 1559    Lab Status: In process Specimen:  Blood from Arm, Left Updated:  06/12/18 1611    Blood culture #2 [[id number]] Collected:  06/12/18 1558    Lab Status: In process Specimen:  Blood from Arm, Right Updated:  06/12/18 1611                 US abdomen limited   Final Result by Ashleigh Malone MD (06/12 2117)      No ascites  Workstation performed: [id number]         XR chest 2 views   Final Result by Bijan Johnson MD (06/12 1641)      Small bilateral pleural effusions  Workstation performed: [id number]                    Procedures  ECG 12 Lead Documentation  Date/Time: 6/12/2018 4:18 PM  Performed by: Marco Antonio Arevalo  Authorized by: Marco Antonio Arevalo     ECG reviewed by me, the ED Provider: yes    Patient location:  ED and bedside  Previous ECG:     Previous ECG:  Compared to current    Comparison ECG info:  October 7, 2015 prior T-wave inversion in 1 and aVL  Rate:     ECG rate:  80    ECG rate assessment: normal    Rhythm:     Rhythm: sinus rhythm    Ectopy:     Ectopy: none    QRS:     QRS axis:  Normal  Conduction:     Conduction: normal    ST segments:     ST segments:  Normal  T waves:     T waves: inverted      Inverted:  AVL           Phone Contacts  ED Phone Contact    ED Course                         Initial Sepsis Screening     [address] Name 06/12/18 2673                Is the patient's history suggestive of a new or worsening infection? (!)  Yes (Proceed)  -SZ        Suspected source of infection soft tissue  -SZ        Are two or more of the following signs & symptoms of infection both present and new to the patient? No  -SZ        Indicate SIRS criteria          If the answer is yes to both questions, suspicion of sepsis is present          If severe sepsis is present AND tissue hypoperfusion perists in the hour after fluid resuscitation or lactate > 4, the patient meets criteria for SEPTIC SHOCK          Are any of the following organ dysfunction criteria present within 6 hours of suspected infection and SIRS criteria that are NOT considered to be chronic conditions?           Organ dysfunction          Date of presentation of severe sepsis          Time of presentation of severe sepsis          Tissue hypoperfusion persists in the hour after crystalloid fluid administration, evidenced, by either:          Was hypotension present within one hour of the conclusion of crystalloid fluid administration?         Date of presentation of septic shock          Time of presentation of septic shock            User Key  (r) = Recorded By, (t) = Taken By, (c) = Cosigned By    [address] Name Provider Type    SHAINA Mejia MD Physician                  MDM  Number of Diagnoses or Management Options  Chest pain:   CHF exacerbation [facility]):   Left leg cellulitis:   Diagnosis management comments: Patient with cellulitis of the left lower leg  Erythema does not extend to the demarcated line suggesting improvement with antibiotics  Patient however does notice increased sensitivity over the open portion of skin  Wound culture obtained  Cephalosporin being continued by IV  Patient does not have sepsis  His leg edema and dyspnea along with intermittent chest discomfort seemed to be from exacerbation of CHF  He did not respond outpatient increase of furosemide dosing and will be brought in for further treatment of this  Do have concern for possible component of cardiac ischemia given new chest discomfort with exertion  CritCare Time    Disposition  Final diagnoses:   CHF exacerbation ([facility] [address] )   Left leg cellulitis   Chest pain     Time reflects when diagnosis was documented in both MDM as applicable and the Disposition within this note     Time User Action Codes Description Comment    6/12/2018  5:39 PM [facility] A Add [I50 9] [facility]     6/12/2018  5:39 PM [facility] A Add [L03 116] Left leg cellulitis     6/12/2018  5:39 PM [facility] A Add [R07 9] Chest pain       ED Disposition     ED Disposition Condition Comment    Admit  Case was discussed with Dr Dinora Olivier and the patient's admission status was agreed to be Admission Status: inpatient status to the service of Dr Dinora Olivier          Follow-up Information    None Current Discharge Medication List      CONTINUE these medications which have NOT CHANGED    Details   amLODIPine (NORVASC) 5 mg tablet Take 5 mg by mouth daily      aspirin 81 MG tablet Take by mouth      B Complex Vitamins (VITAMIN B COMPLEX PO) Take by mouth      cephalexin (KEFLEX) 500 mg capsule Take 500 mg by mouth every 6 (six) hours      ferrous sulfate 325 (65 Fe) mg tablet Take 325 mg by mouth daily with breakfast      furosemide (LASIX) 40 mg tablet Take by mouth 2 (two) times a day        Insulin Aspart (NOVOLOG SC) Inject under the skin      Insulin Glargine (LANTUS SOLOSTAR) 100 UNIT/ML SOPN Inject under the skin      metFORMIN (GLUCOPHAGE) 500 mg tablet Take 500 mg by mouth 2 (two) times a day with meals      metoprolol tartrate (LOPRESSOR) 25 mg tablet Take 12 5 mg by mouth every 12 (twelve) hours        Multiple Vitamin (ONE-A-DAY MENS PO) Take by mouth daily      omeprazole (PriLOSEC) 20 mg delayed release capsule Take 20 mg by mouth 2 (two) times a day      potassium chloride (K-DUR,KLOR-CON) 10 mEq tablet Take by mouth           No discharge procedures on file      ED Provider  Electronically Signed by           Gary Blanc MD  06/13/18 5384

## 2018-06-12 NOTE — ASSESSMENT & PLAN NOTE
· Increasing SOB/HUNTER, B/L LE edema, poor activity tolerance  BNP elevated at 4K   CXR with small b/l pleural effusions  · ECHO from 2015 (post CABG) shows EF of 555 with Grade 2 diastolic dysfunction  · Admit  · Telemetry  · Trend troponins  · Lasix 80mg IV x1 in ED-- monitor response to diuresis, if good response may continue BID until cardiology eval  · ECHO  · Cardiology evaluation-- appreciate input

## 2018-06-12 NOTE — ED NOTES
Pt's vital signs stable and hemodynamically stable  Pt (-) new arrhythmias present and (-) chest pain  Therefore, pt transported to assigned room by ED rory Piper RN  06/12/18 4239

## 2018-06-12 NOTE — ASSESSMENT & PLAN NOTE
· L leg erythema/edema/warmth  · Treated with Keflex as an outpatient-- slightly improved  · Change to Ancef while inpatient

## 2018-06-12 NOTE — ASSESSMENT & PLAN NOTE
Lab Results   Component Value Date    HGBA1C 10 4 (H) 01/30/2017       No results for input(s): POCGLU in the last 72 hours      Blood Sugar Average: Last 72 hrs:       · Continue home insulin regimen + SSI for correction  · Hold Metformin while inpatient

## 2018-06-12 NOTE — ASSESSMENT & PLAN NOTE
· Unclear of patient's baseline-- per review of prior records his Cr appears to be anywhere between 1 5 and 1 9  · Today 2 01  · New baseline vs  2/2 volume overload  · Monitor BMP with diuresis

## 2018-06-12 NOTE — ED NOTES
It was noted that there was blood on the bottom of the patients left foot on the sock  I removed the sock and notifed the physician  The physician requested that his leg and foot to be re-wrapped with gauze  Leg and foot dressing was changed        Simona Reveles  06/12/18 7758

## 2018-06-12 NOTE — H&P
H&P- Mari Morrow 1955, 58 y o  male MRN: [id number]    Unit/Bed#: DEENA Encounter: [id number]    Primary Care Provider: Jasbir Watkins MD   Date and time admitted to hospital: 6/12/2018  3:20 PM    * Acute on chronic combined systolic and diastolic CHF (congestive heart failure) ([facility])   Assessment & Plan    · Increasing SOB/HUNTER, B/L LE edema, poor activity tolerance  BNP elevated at 4K  CXR with small b/l pleural effusions  · ECHO from 2015 (post CABG) shows EF of 555 with Grade 2 diastolic dysfunction  · Admit  · Telemetry  · Trend troponins  · Lasix 80mg IV x1 in ED-- monitor response to diuresis, if good response may continue BID until cardiology eval  · ECHO  · Cardiology evaluation-- appreciate input        Cellulitis of left leg   Assessment & Plan    · L leg erythema/edema/warmth  · Treated with Keflex as an outpatient-- slightly improved  · Change to Ancef while inpatient        Essential hypertension   Assessment & Plan    · BPs 150s-160s on current regimen  · Continue home medications-- there is room to increase both metoprolo/amlodipine to gain better control        ALESIA (acute kidney injury) ([facility] [address] )   Assessment & Plan    · Unclear of patient's baseline-- per review of prior records his Cr appears to be anywhere between 1 5 and 1 9  · Today 2 01  · New baseline vs  2/2 volume overload  · Monitor BMP with diuresis         Type 2 diabetes mellitus with complication, with long-term current use of insulin ([facility])   Assessment & Plan    Lab Results   Component Value Date    HGBA1C 10 4 (H) 01/30/2017       No results for input(s): POCGLU in the last 72 hours      Blood Sugar Average: Last 72 hrs:       · Continue home insulin regimen + SSI for correction  · Hold Metformin while inpatient        GERD (gastroesophageal reflux disease)   Assessment & Plan    · Continue PPI          VTE Prophylaxis: Heparin  / reason for no mechanical VTE prophylaxis b/l LE wounds   Code Status: Level 1  POLST: There is no POLST form on file for this patient (pre-hospital)  Discussion with family: none    Anticipated Length of Stay:  Patient will be admitted on an Inpatient basis with an anticipated length of stay of  > 2 midnights  Justification for Hospital Stay: IV diuresis    Total Time for Visit, including Counseling / Coordination of Care: 30 minutes  Greater than 50% of this total time spent on direct patient counseling and coordination of care  Chief Complaint:   SOB, LLE pain    History of Present Illness:    Katie Monzon is a 58 y o  male with past medical history significant for chronic combined CHF, type 2 diabetes, hypertension, hyperlipidemia presents the ED for evaluation of left lower extremity swelling and shortness of breath x1 week  Patient reports the swelling has been ongoing for likely a few months, but acutely worsened over the last 1 week  Patient reports he developed an open sore on the left portion of his leg  He was prescribed Keflex on 6/6/2018  Patient has been taking Keflex as prescribed  His visiting nurse yesterday noticed that his leg was more hot to touch  Patient also reports that is more tender than the other portion of his leg  Additionally, patient complains of shortness of breath that has been worsening over the last 1 week  He reports that he is having difficulty ambulating from bed to bathroom without feeling short of breath and needing to rest   Admits to bilateral lower extremity edema  Also admits to a 2-3 lb weight gain over the last 2 days despite increasing his lasix  Denies overall orthopnea, but did have an episode of shortness of breath that awoke him from rest   No chest pain  No diaphoresis  No palpitations  Patient additionally reports abdominal distension  Patient has tried increasing his Lasix at home with minimal improvement of symptoms  Patient follows with nephrologist and cardiologist at the [facility]    He also has visiting nurses set up with the [facility]    Review of Systems:    Review of Systems   Constitutional: Negative  HENT: Negative  Respiratory: Positive for shortness of breath  Cardiovascular: Positive for leg swelling  Gastrointestinal: Positive for abdominal distention  Genitourinary: Negative  Musculoskeletal: Negative  Skin: Negative  Neurological: Negative  Hematological: Negative  Psychiatric/Behavioral: Negative  Past Medical and Surgical History:     Past Medical History:   Diagnosis Date    Diabetes mellitus ([facility] [address] )     Hypertension     Wound, open, foot        Past Surgical History:   Procedure Laterality Date    CORONARY ARTERY BYPASS GRAFT      x4 stents     DENTAL SURGERY      implant    RHINOPLASTY         Meds/Allergies:    Prior to Admission medications    Medication Sig Start Date End Date Taking?  Authorizing Provider   amLODIPine (NORVASC) 5 mg tablet Take 5 mg by mouth daily   Yes Historical Provider, MD   aspirin 81 MG tablet Take by mouth 9/4/15  Yes Historical Provider, MD   B Complex Vitamins (VITAMIN B COMPLEX PO) Take by mouth   Yes Historical Provider, MD   cephalexin (KEFLEX) 500 mg capsule Take 500 mg by mouth every 6 (six) hours 6/6/18 6/16/18 Yes Historical Provider, MD   ferrous sulfate 325 (65 Fe) mg tablet Take 325 mg by mouth daily with breakfast   Yes Historical Provider, MD   furosemide (LASIX) 40 mg tablet Take by mouth 2 (two) times a day   8/25/15  Yes Historical Provider, MD   Insulin Aspart (NOVOLOG SC) Inject under the skin   Yes Historical Provider, MD   Insulin Glargine (LANTUS SOLOSTAR) 100 UNIT/ML SOPN Inject under the skin 10/20/15  Yes Historical Provider, MD   metFORMIN (GLUCOPHAGE) 500 mg tablet Take 500 mg by mouth 2 (two) times a day with meals   Yes Historical Provider, MD   metoprolol tartrate (LOPRESSOR) 25 mg tablet Take 12 5 mg by mouth every 12 (twelve) hours     Yes Historical Provider, MD   Multiple Vitamin (ONE-A-DAY MENS PO) Take by mouth daily   Yes Historical Provider, MD   omeprazole (PriLOSEC) 20 mg delayed release capsule Take 20 mg by mouth 2 (two) times a day   Yes Historical Provider, MD   potassium chloride (K-DUR,KLOR-CON) 10 mEq tablet Take by mouth 9/15/15  Yes Historical Provider, MD   Albiglutide (TANZEUM) 30 MG PEN Inject under the skin 10/6/15 6/12/18  Historical Provider, MD   atorvastatin (LIPITOR) 40 mg tablet Take 40 mg by mouth  6/12/18  Historical Provider, MD   furosemide (LASIX) 40 mg tablet Take by mouth  6/12/18  Historical Provider, MD   glipiZIDE (GLUCOTROL XL) 2 5 mg 24 hr tablet Take by mouth 10/20/15 6/12/18  Historical Provider, MD   lisinopril (ZESTRIL) 2 5 mg tablet Take by mouth 10/26/15 6/12/18  Historical Provider, MD   nitroglycerin (NITROSTAT) 0 4 mg SL tablet Place under the tongue  6/12/18  Historical Provider, MD   prasugrel (EFFIENT) tablet Take by mouth  6/12/18  Historical Provider, MD   traMADol Annemarie Brocks) 50 mg tablet Take 50 mg by mouth  6/12/18  Historical Provider, MD     I have reviewed home medications with patient personally  Allergies: Allergies   Allergen Reactions    Amoxicillin Swelling    Lisinopril Cough       Social History:     Marital Status: /Civil Union   Occupation: disabled   Patient Pre-hospital Living Situation: home with family  Patient Pre-hospital Level of Mobility: independent  Patient Pre-hospital Diet Restrictions: low salt  Substance Use History:   History   Alcohol Use No     History   Smoking Status    Never Smoker   Smokeless Tobacco    Never Used     History   Drug Use No       Family History:    non-contributory    Physical Exam:     Vitals:   Blood Pressure: 165/79 (06/12/18 1800)  Pulse: 76 (06/12/18 1800)  Temperature: 97 8 °F (36 6 °C) (06/12/18 1452)  Temp Source: Oral (06/12/18 1452)  Respirations: 18 (06/12/18 1726)  Weight - Scale: 86 2 kg (190 lb) (06/12/18 1452)  SpO2: 94 % (06/12/18 1800)    Physical Exam   Constitutional: He is oriented to person, place, and time   He appears well-developed and well-nourished  He appears distressed  HENT:   Head: Normocephalic and atraumatic  Mouth/Throat: No oropharyngeal exudate  Eyes: Conjunctivae and EOM are normal  Pupils are equal, round, and reactive to light  No scleral icterus  Neck: No JVD present  Cardiovascular: Normal rate and regular rhythm  Exam reveals no gallop and no friction rub  No murmur heard  Pulmonary/Chest: Effort normal  No respiratory distress  He has no wheezes  He has rales (trace bibasilar)  Abdominal: He exhibits distension  There is no tenderness  There is no rebound and no guarding  Musculoskeletal: He exhibits edema (b/l LE +2 extending from dorsum of foot to mid-thigh) and tenderness (LLE-- pretibial)  Neurological: He is alert and oriented to person, place, and time  He displays normal reflexes  No cranial nerve deficit  He exhibits normal muscle tone  Skin: Skin is warm and dry  No rash noted  He is not diaphoretic  There is erythema (mostly chronic venous stasis changes, but some overlying erythema on the L shin)  Open wound on L shin  Also wound on R shin, but crusted over   Psychiatric: He has a normal mood and affect  His behavior is normal        Additional Data:     Lab Results: I have personally reviewed pertinent reports          Results from last 7 days  Lab Units 06/12/18  1559   WBC Thousand/uL 8 07   HEMOGLOBIN g/dL 11 7*   HEMATOCRIT % 35 0*   PLATELETS Thousands/uL 263   NEUTROS PCT % 74   LYMPHS PCT % 14   MONOS PCT % 9   EOS PCT % 3       Results from last 7 days  Lab Units 06/12/18  1559   SODIUM mmol/L 139   POTASSIUM mmol/L 4 1   CHLORIDE mmol/L 104   CO2 mmol/L 24   BUN mg/dL 39*   CREATININE mg/dL 2 01*   CALCIUM mg/dL 9 1   TOTAL PROTEIN g/dL 8 5*   BILIRUBIN TOTAL mg/dL 1 20*   ALK PHOS U/L 86   ALT U/L 33   AST U/L 19   GLUCOSE RANDOM mg/dL 104       Results from last 7 days  Lab Units 06/12/18  1559   INR  0 97               Imaging: I have personally reviewed pertinent reports  XR chest 2 views   Final Result by Bret Hua MD (06/12 3881)      Small bilateral pleural effusions  Workstation performed: [id number]             EKG, Pathology, and Other Studies Reviewed on Admission:   · EKG: NSR rate 80, known T wave inversion aVL still present    Allscripts / Epic Records Reviewed: Yes     ** Please Note: This note has been constructed using a voice recognition system   **

## 2018-06-12 NOTE — ASSESSMENT & PLAN NOTE
· BPs 150s-160s on current regimen  · Continue home medications-- there is room to increase both metoprolo/amlodipine to gain better control

## 2018-06-13 ENCOUNTER — APPOINTMENT (INPATIENT)
Dept: NON INVASIVE DIAGNOSTICS | Facility: HOSPITAL | Age: 63
DRG: 291 | End: 2018-06-13
Payer: COMMERCIAL

## 2018-06-13 PROBLEM — I25.10 CAD (CORONARY ARTERY DISEASE): Chronic | Status: ACTIVE | Noted: 2018-06-13

## 2018-06-13 PROBLEM — N17.9 ACUTE KIDNEY INJURY SUPERIMPOSED ON CHRONIC KIDNEY DISEASE (HCC): Chronic | Status: ACTIVE | Noted: 2018-06-12

## 2018-06-13 PROBLEM — N18.9 ACUTE KIDNEY INJURY SUPERIMPOSED ON CHRONIC KIDNEY DISEASE (HCC): Chronic | Status: ACTIVE | Noted: 2018-06-12

## 2018-06-13 PROBLEM — E78.5 HYPERLIPIDEMIA: Chronic | Status: ACTIVE | Noted: 2018-06-13

## 2018-06-13 LAB
ALBUMIN SERPL BCP-MCNC: 2.8 G/DL (ref 3.5–5)
ALP SERPL-CCNC: 70 U/L (ref 46–116)
ALT SERPL W P-5'-P-CCNC: 24 U/L (ref 12–78)
ANION GAP SERPL CALCULATED.3IONS-SCNC: 10 MMOL/L (ref 4–13)
AST SERPL W P-5'-P-CCNC: 15 U/L (ref 5–45)
ATRIAL RATE: 80 BPM
BILIRUB SERPL-MCNC: 0.8 MG/DL (ref 0.2–1)
BUN SERPL-MCNC: 36 MG/DL (ref 5–25)
CALCIUM SERPL-MCNC: 8.6 MG/DL (ref 8.3–10.1)
CHLORIDE SERPL-SCNC: 107 MMOL/L (ref 100–108)
CHOLEST SERPL-MCNC: 126 MG/DL (ref 50–200)
CO2 SERPL-SCNC: 25 MMOL/L (ref 21–32)
CREAT SERPL-MCNC: 1.78 MG/DL (ref 0.6–1.3)
ERYTHROCYTE [DISTWIDTH] IN BLOOD BY AUTOMATED COUNT: 14.7 % (ref 11.6–15.1)
EST. AVERAGE GLUCOSE BLD GHB EST-MCNC: 214 MG/DL
GFR SERPL CREATININE-BSD FRML MDRD: 39 ML/MIN/1.73SQ M
GLUCOSE SERPL-MCNC: 121 MG/DL (ref 65–140)
GLUCOSE SERPL-MCNC: 125 MG/DL (ref 65–140)
GLUCOSE SERPL-MCNC: 139 MG/DL (ref 65–140)
GLUCOSE SERPL-MCNC: 194 MG/DL (ref 65–140)
GLUCOSE SERPL-MCNC: 271 MG/DL (ref 65–140)
HBA1C MFR BLD: 9.1 % (ref 4.2–6.3)
HCT VFR BLD AUTO: 30.7 % (ref 36.5–49.3)
HDLC SERPL-MCNC: 42 MG/DL (ref 40–60)
HGB BLD-MCNC: 9.9 G/DL (ref 12–17)
LDLC SERPL CALC-MCNC: 75 MG/DL (ref 0–100)
MAGNESIUM SERPL-MCNC: 2.4 MG/DL (ref 1.6–2.6)
MCH RBC QN AUTO: 30.3 PG (ref 26.8–34.3)
MCHC RBC AUTO-ENTMCNC: 32.2 G/DL (ref 31.4–37.4)
MCV RBC AUTO: 94 FL (ref 82–98)
P AXIS: 47 DEGREES
PLATELET # BLD AUTO: 220 THOUSANDS/UL (ref 149–390)
PMV BLD AUTO: 10.1 FL (ref 8.9–12.7)
POTASSIUM SERPL-SCNC: 4.3 MMOL/L (ref 3.5–5.3)
PR INTERVAL: 198 MS
PROT SERPL-MCNC: 6.7 G/DL (ref 6.4–8.2)
QRS AXIS: 2 DEGREES
QRSD INTERVAL: 94 MS
QT INTERVAL: 384 MS
QTC INTERVAL: 442 MS
RBC # BLD AUTO: 3.27 MILLION/UL (ref 3.88–5.62)
SODIUM SERPL-SCNC: 142 MMOL/L (ref 136–145)
T WAVE AXIS: 70 DEGREES
TRIGL SERPL-MCNC: 46 MG/DL
TROPONIN I SERPL-MCNC: <0.02 NG/ML
VENTRICULAR RATE: 80 BPM
WBC # BLD AUTO: 4.95 THOUSAND/UL (ref 4.31–10.16)

## 2018-06-13 PROCEDURE — 83036 HEMOGLOBIN GLYCOSYLATED A1C: CPT | Performed by: INTERNAL MEDICINE

## 2018-06-13 PROCEDURE — 99232 SBSQ HOSP IP/OBS MODERATE 35: CPT | Performed by: INTERNAL MEDICINE

## 2018-06-13 PROCEDURE — 80061 LIPID PANEL: CPT | Performed by: PHYSICIAN ASSISTANT

## 2018-06-13 PROCEDURE — 80053 COMPREHEN METABOLIC PANEL: CPT | Performed by: PHYSICIAN ASSISTANT

## 2018-06-13 PROCEDURE — 83735 ASSAY OF MAGNESIUM: CPT | Performed by: PHYSICIAN ASSISTANT

## 2018-06-13 PROCEDURE — 99254 IP/OBS CNSLTJ NEW/EST MOD 60: CPT | Performed by: INTERNAL MEDICINE

## 2018-06-13 PROCEDURE — 93010 ELECTROCARDIOGRAM REPORT: CPT | Performed by: INTERNAL MEDICINE

## 2018-06-13 PROCEDURE — 85027 COMPLETE CBC AUTOMATED: CPT | Performed by: PHYSICIAN ASSISTANT

## 2018-06-13 PROCEDURE — 93306 TTE W/DOPPLER COMPLETE: CPT

## 2018-06-13 PROCEDURE — 82948 REAGENT STRIP/BLOOD GLUCOSE: CPT

## 2018-06-13 RX ORDER — POTASSIUM CHLORIDE 20 MEQ/1
20 TABLET, EXTENDED RELEASE ORAL 2 TIMES DAILY
Status: DISCONTINUED | OUTPATIENT
Start: 2018-06-13 | End: 2018-06-14 | Stop reason: HOSPADM

## 2018-06-13 RX ORDER — FUROSEMIDE 10 MG/ML
80 INJECTION INTRAMUSCULAR; INTRAVENOUS
Status: DISCONTINUED | OUTPATIENT
Start: 2018-06-13 | End: 2018-06-14 | Stop reason: HOSPADM

## 2018-06-13 RX ORDER — ECHINACEA PURPUREA EXTRACT 125 MG
1 TABLET ORAL EVERY 2 HOUR PRN
Status: DISCONTINUED | OUTPATIENT
Start: 2018-06-13 | End: 2018-06-14 | Stop reason: HOSPADM

## 2018-06-13 RX ORDER — HEPARIN SODIUM 5000 [USP'U]/ML
5000 INJECTION, SOLUTION INTRAVENOUS; SUBCUTANEOUS EVERY 8 HOURS SCHEDULED
Status: DISCONTINUED | OUTPATIENT
Start: 2018-06-13 | End: 2018-06-14 | Stop reason: HOSPADM

## 2018-06-13 RX ADMIN — HEPARIN SODIUM 5000 UNITS: 5000 INJECTION, SOLUTION INTRAVENOUS; SUBCUTANEOUS at 13:04

## 2018-06-13 RX ADMIN — ASPIRIN 81 MG 81 MG: 81 TABLET ORAL at 08:18

## 2018-06-13 RX ADMIN — INSULIN LISPRO 4 UNITS: 100 INJECTION, SOLUTION INTRAVENOUS; SUBCUTANEOUS at 11:49

## 2018-06-13 RX ADMIN — FUROSEMIDE 80 MG: 10 INJECTION, SOLUTION INTRAMUSCULAR; INTRAVENOUS at 08:17

## 2018-06-13 RX ADMIN — POTASSIUM CHLORIDE 20 MEQ: 1500 TABLET, EXTENDED RELEASE ORAL at 17:04

## 2018-06-13 RX ADMIN — POTASSIUM CHLORIDE 10 MEQ: 750 TABLET, EXTENDED RELEASE ORAL at 08:18

## 2018-06-13 RX ADMIN — INSULIN LISPRO 1 UNITS: 100 INJECTION, SOLUTION INTRAVENOUS; SUBCUTANEOUS at 21:10

## 2018-06-13 RX ADMIN — Medication 1 SPRAY: at 05:24

## 2018-06-13 RX ADMIN — Medication 1 SPRAY: at 21:18

## 2018-06-13 RX ADMIN — FERROUS SULFATE TAB 325 MG (65 MG ELEMENTAL FE) 325 MG: 325 (65 FE) TAB at 08:18

## 2018-06-13 RX ADMIN — AMLODIPINE BESYLATE 5 MG: 5 TABLET ORAL at 08:17

## 2018-06-13 RX ADMIN — FUROSEMIDE 80 MG: 10 INJECTION, SOLUTION INTRAMUSCULAR; INTRAVENOUS at 13:04

## 2018-06-13 RX ADMIN — ENOXAPARIN SODIUM 40 MG: 40 INJECTION SUBCUTANEOUS at 08:17

## 2018-06-13 RX ADMIN — POTASSIUM CHLORIDE 20 MEQ: 1500 TABLET, EXTENDED RELEASE ORAL at 13:06

## 2018-06-13 RX ADMIN — METOPROLOL TARTRATE 12.5 MG: 25 TABLET ORAL at 21:07

## 2018-06-13 RX ADMIN — METOPROLOL TARTRATE 12.5 MG: 25 TABLET ORAL at 08:18

## 2018-06-13 RX ADMIN — FUROSEMIDE 80 MG: 10 INJECTION, SOLUTION INTRAMUSCULAR; INTRAVENOUS at 17:04

## 2018-06-13 RX ADMIN — PANTOPRAZOLE SODIUM 40 MG: 40 TABLET, DELAYED RELEASE ORAL at 05:13

## 2018-06-13 RX ADMIN — CEFAZOLIN SODIUM 1000 MG: 1 SOLUTION INTRAVENOUS at 17:04

## 2018-06-13 RX ADMIN — CEFAZOLIN SODIUM 1000 MG: 1 SOLUTION INTRAVENOUS at 05:13

## 2018-06-13 RX ADMIN — HEPARIN SODIUM 5000 UNITS: 5000 INJECTION, SOLUTION INTRAVENOUS; SUBCUTANEOUS at 21:07

## 2018-06-13 RX ADMIN — SENNOSIDES 8.6 MG: 8.6 TABLET, FILM COATED ORAL at 08:18

## 2018-06-13 NOTE — ASSESSMENT & PLAN NOTE
· Increasing SOB/HUNTER, B/L LE edema, poor activity tolerance  BNP elevated at 4K  CXR with small b/l pleural effusions  · ECHO from 2015 (post CABG) shows EF of 555 with Grade 2 diastolic dysfunction  · Repeat pending  ·   Cardiology following, appreciate input  Patient currently on Lasix 80 mg IV b I d  ( home dose 40 mg b I d )  ·  strict I&Os, low-sodium diet, daily weights    Dietary consult pending for heart failure education  ·  wean oxygen as tolerated, patient not typically on O2

## 2018-06-13 NOTE — DISCHARGE INSTRUCTIONS
Take your medications as directed, and keep your follow up appointments  Adhere to a heart healthy lifestyle, maintaining a low sodium diet  Daily weight and record  If your weight increases 2-3 lbs in one day, or 5 lbs in 2 days, you are short of breath or have lower extremity swelling, please call the heart failure team at  [facility] at [phone]  Heart Failure   WHAT YOU NEED TO KNOW:   Heart failure (HF) is a condition that does not allow your heart to fill or pump properly  Not enough oxygen in your blood gets to your organs and tissues  HF can occur in the right side, the left side, or both lower chambers of your heart  HF is often caused by damage or injury to your heart  The damage may be caused by heart attack, other heart conditions, or high blood pressure  HF is a long-term condition that tends to get worse over time  It is important to manage your health to improve your quality of life  HF can be worsened by heavy alcohol use, smoking, diabetes that is not controlled, or obesity  DISCHARGE INSTRUCTIONS:   Call 911 if:   · You have any of the following signs of a heart attack:      ¨ Squeezing, pressure, or pain in your chest that lasts longer than 5 minutes or returns    ¨ Discomfort or pain in your back, neck, jaw, stomach, or arm     ¨ Trouble breathing    ¨ Nausea or vomiting    ¨ Lightheadedness or a sudden cold sweat, especially with chest pain or trouble breathing    Seek care immediately if:   · You gain 3 or more pounds (1 4 kg) in a day, or more than your healthcare provider says you should  · Your heartbeat is fast, slow, or uneven all the time    Contact your healthcare provider if:   · You have symptoms of worsening HF:      ¨ Shortness of breath at rest, at night, or that is getting worse in any way     ¨ Weight gain of 5 or more pounds (2 2 kg) in a week     ¨ More swelling in your legs or ankles     ¨ Abdominal pain or swelling     ¨ More coughing ¨ Loss of appetite     ¨ Feeling tired all the time    · You feel hopeless or depressed, or you have lost interest in things you used to enjoy  · You often feel worried or afraid  · You have questions or concerns about your condition or care  Medicines: You may  need any of the following:  · Medicines  may be given to help regulate your heart rhythm  You may also need medicines to lower your blood pressure, and to get rid of extra fluids  · Take your medicine as directed  Contact your healthcare provider if you think your medicine is not helping or if you have side effects  Tell him or her if you are allergic to any medicine  Keep a list of the medicines, vitamins, and herbs you take  Include the amounts, and when and why you take them  Bring the list or the pill bottles to follow-up visits  Carry your medicine list with you in case of an emergency  Follow up with your healthcare provider or cardiologist as directed: You may need to return for other tests  You may need home health care  A healthcare provider will monitor your vital signs, weight, and make sure your medicines are working  Write down your questions so you remember to ask them during your visits  Go to cardiac rehab as directed:  Cardiac rehab is a program run by specialists who will help you safely strengthen your heart  The program includes exercise, relaxation, stress management, and heart-healthy nutrition  Healthcare providers will also make sure your medicines are helping to reduce your symptoms  Manage your HF:  · Do not smoke  Nicotine and other chemicals in cigarettes and cigars can cause lung damage and make HF difficult to manage  Ask your healthcare provider for information if you currently smoke and need help to quit  E-cigarettes or smokeless tobacco still contain nicotine  Talk to your healthcare provider before you use these products  · Do not drink alcohol or take illegal drugs    Alcohol and drugs can worsen your symptoms quickly  · Weigh yourself every morning  Use the same scale, in the same spot  Do this after you use the bathroom, but before you eat or drink anything  Wear the same type of clothing  Do not wear shoes  Record your weight each day so you will notice any sudden weight gain  Swelling and weight gain are signs of fluid retention  If you are overweight, ask how to lose weight safely  · Check your blood pressure and heart rate every day  Ask for more information about how to measure your blood pressure and heart rate correctly  Ask what these numbers should be for you  · Manage any chronic health conditions you have  These include high blood pressure, diabetes, obesity, high cholesterol, metabolic syndrome, and COPD  You will have fewer symptoms if you manage these health conditions  Follow your healthcare provider's recommendations and follow up with him or her regularly  · Eat heart-healthy foods and limit sodium (salt)  An easy way to do this is to eat more fresh fruits and vegetables and fewer canned and processed foods  Replace butter and margarine with heart-healthy oils such as olive oil and canola oil  Other heart-healthy foods include walnuts, whole-grain breads, low-fat dairy products, beans, and lean meats  Fatty fish such as salmon and tuna are also heart healthy  Ask how much salt you can eat each day  Do not use salt substitutes  · Drink liquids as directed  You may need to limit the amount of liquids you drink if you retain fluid  Ask how much liquid to drink each day and which liquids are best for you  · Stay active  If you are not active, your symptoms are likely to worsen quickly  Walking, bicycling, and other types of physical activity help maintain your strength and improve your mood  Physical activity also helps you manage your weight  Work with your healthcare provider to create an exercise plan that is right for you       · Get vaccines as directed  Get a flu shot every year  You may also need the pneumonia vaccine  The flu and pneumonia can be severe for a person who has HF  Vaccines protect you from these infections  Join a support group:  Living with HF can be difficult  It may be helpful to talk with others who have HF  You may learn how to better manage your condition or get emotional support  For more information:   · [employer] [address] [place] [address]   Phone: [phone]  Web Address: https://www strong com/  org   © 2017 [employer] Information is for End User's use only and may not be sold, redistributed or otherwise used for commercial purposes  All illustrations and images included in CareNotes® are the copyrighted property of A [employer]  or Jamal Patrick  The above information is an  only  It is not intended as medical advice for individual conditions or treatments  Talk to your doctor, nurse or pharmacist before following any medical regimen to see if it is safe and effective for you

## 2018-06-13 NOTE — CONSULTS
Reviewed low Na diet recommendations and provided written material on same  Pt very receptive to the information and participated in discussion  Had few questions regarding low Na foods along w/ CHO foods for BG control, and all were addressed  Will follow to reinforce diet ed  Per RD protocol, adjusted diet to 2gm Na and added CCD2 modifier; continue w/ fluid restriction as appropriate

## 2018-06-13 NOTE — ASSESSMENT & PLAN NOTE
· L leg erythema/edema/warmth  · Treated with Keflex as an outpatient-- slightly improved  ·  continue IV Ancef while inpatient  Patient afebrile    Appreciate wound care consultation in regards to dressing changes

## 2018-06-13 NOTE — ASSESSMENT & PLAN NOTE
· Unclear of patient's baseline-- per review of prior records his Cr appears to be anywhere between 1 7 and 1 9  ·  on admission 2 01, has improved somewhat with IV diuresis to 1 78  ·  monitor BMP closely

## 2018-06-13 NOTE — PLAN OF CARE
Problem: DISCHARGE PLANNING - CARE MANAGEMENT  Goal: Discharge to post-acute care or home with appropriate resources  INTERVENTIONS:  - Conduct assessment to determine patient/family and health care team treatment goals, and need for post-acute services based on payer coverage, community resources, and patient preferences, and barriers to discharge  - Address psychosocial, clinical, and financial barriers to discharge as identified in assessment in conjunction with the patient/family and health care team  - Arrange appropriate level of post-acute services according to patients   needs and preference and payer coverage in collaboration with the physician and health care team  - Communicate with and update the patient/family, physician, and health care team regarding progress on the discharge plan  - Arrange appropriate transportation to post-acute venues  Outcome: Progressing  CM met with Pt at bedside  Pt lives in a 2 level home with 1 ROSALVA  Pt is currently living alone because his wife is currently staying with her sister due to medical conditions  Pt uses a "hurry-cane"  Pt is independent with ambulation and ADL's  Pt is current with [facility] for wound care, CM submitted [facility]  Pt does not have a history of Rehab  [facility] [address]  Pt denies any MH or D&A history  Pt does not have a POA and does not want info   Pt's friend transport Pt to appointments       CM name and role reviewed and Discharge Checklist provided          CM dept will follow Pt until discharge

## 2018-06-13 NOTE — CASE MANAGEMENT
Initial Clinical Review    Admission: Date/Time/Statement: 6/12/18 @ 1755 INPATIENT    Orders Placed This Encounter   Procedures    Inpatient Admission (expected length of stay for this patient is greater than two midnights)     Standing Status:   Standing     Number of Occurrences:   1     Order Specific Question:   Admitting Physician     Answer:   Cuate Moreno     Order Specific Question:   Level of Care     Answer:   Med Surg [16]     Order Specific Question:   Bed request comments     Answer:   telemetry     Order Specific Question:   Estimated length of stay     Answer:   More than 2 Midnights     Order Specific Question:   Certification     Answer:   I certify that inpatient services are medically necessary for this patient for a duration of greater than two midnights  See H&P and MD Progress Notes for additional information about the patient's course of treatment  ED: Date/Time/Mode of Arrival:   ED Arrival Information     Expected Arrival Acuity Means of Arrival Escorted By Service Admission Type    - 6/12/2018 14:39 Urgent Wheelchair Self General Medicine Urgent    Arrival Complaint    leg swelling, sob        Chief Complaint:   Chief Complaint   Patient presents with    Leg Pain     pt c/o leg pain swelling and hot to touch, left leg shin area  pt c/o SOB as well  History of Illness:    Kathryn Hernandez is a 58 y o  male with past medical history significant for chronic combined CHF, type 2 diabetes, hypertension, hyperlipidemia presents the ED for evaluation of left lower extremity swelling and shortness of breath x1 week  Patient reports the swelling has been ongoing for likely a few months, but acutely worsened over the last 1 week  Patient reports he developed an open sore on the left portion of his leg  He was prescribed Keflex on 6/6/2018  Patient has been taking Keflex as prescribed  His visiting nurse yesterday noticed that his leg was more hot to touch    Patient also reports that it is more tender than the other portion of his leg  Additionally, patient complains of shortness of breath that has been worsening over the last 1 week  He reports that he is having difficulty ambulating from bed to bathroom without feeling short of breath and needing to rest   Also admits to a 2-3 lb weight gain over the last 2 days despite increasing his lasix  Patient additionally reports abdominal distension  Patient has tried increasing his Lasix at home with minimal improvement of symptoms  ED Vital Signs:   ED Triage Vitals [06/12/18 1452]   Temperature Pulse Respirations Blood Pressure SpO2   97 8 °F (36 6 °C) 73 18 158/74 95 %      Temp Source Heart Rate Source Patient Position - Orthostatic VS BP Location FiO2 (%)   Oral Monitor Sitting Left arm --      Pain Score 8                  Wt Readings from Last 1 Encounters:   06/13/18 85 8 kg (189 lb 2 5 oz)       Vital Signs: WNL    Abnormal Labs/Diagnostic Test Results:     proBNP = 4,974  BUN/Creatinine = 39/2 01  Lactic acid = 2 6  HgbA1C = 9 1    EKG: Normal sinus rhythm  Nonspecific ST and T wave abnormality    ECHO: results pending    Chest x-ray: B/L pleural effusions     Ultrasound abdomen: no ascites    ED Treatment:   Medication Administration from 06/12/2018 1439 to 06/12/2018 1910       Date/Time Order Dose Route Action     06/12/2018 1601 ceFAZolin (ANCEF) IVPB (premix) 2,000 mg 2,000 mg Intravenous New Bag     06/12/2018 1740 aspirin tablet 325 mg 325 mg Oral Given     06/12/2018 1742 furosemide (LASIX) injection 80 mg 80 mg Intravenous Given          Past Medical/Surgical History:     Past Medical History:   Diagnosis Date    Diabetes mellitus ([facility] )     Hypertension     Wound, open, foot        Admitting Diagnosis: Leg pain [M79 606]  Chest pain [R07 9]  CHF exacerbation ([facility] ) [I50 9]  Left leg cellulitis [G01 580]    Age/Sex: 58 y o  male    Assessment/Plan:         * Acute on chronic combined systolic and diastolic CHF (congestive heart failure) ([facility])   Assessment & Plan     · Increasing SOB/HUNTER, B/L LE edema, poor activity tolerance  BNP elevated at 4K  CXR with  b/l pleural effusions  · ECHO from 2015 (post CABG) shows EF of 555 with Grade 2 diastolic dysfunction  · Admit  · Telemetry  · Trend troponins  · Lasix 80mg IV x1 in ED-- monitor response to diuresis, if good response may continue BID until cardiology eval  · ECHO  · Cardiology evaluation-- appreciate input          Cellulitis of left leg   Assessment & Plan     · L leg erythema/edema/warmth  · Treated with Keflex as an outpatient-- slightly improved  · Change to Ancef while inpatient          Essential hypertension   Assessment & Plan     · BPs 150s-160s on current regimen  · Continue home medications-- there is room to increase both metoprolo/amlodipine to gain better control          ALESIA (acute kidney injury) ([facility] )   Assessment & Plan     · Unclear of patient's baseline-- per review of prior records his Cr appears to be anywhere between 1 5 and 1 9  · Today 2 01  · New baseline vs  2/2 volume overload  · Monitor BMP with diuresis           Type 2 diabetes mellitus with complication, with long-term current use of insulin ([facility])   Assessment & Plan       HGBA1C 10 4 (H) 01/30/2017         · Continue home insulin regimen + SSI for correction  · Hold Metformin while inpatient          GERD (gastroesophageal reflux disease)   Assessment & Plan     · Continue PPI             VTE Prophylaxis: Heparin Antonetta Pong for no mechanical VTE prophylaxis b/l LE wounds     Anticipated Length of Stay:  Patient will be admitted on an Inpatient basis with an anticipated length of stay of  > 2 midnights     Justification for Hospital Stay: IV diuresis    Admission Orders:    Cardiology Consult  Wound Care Consult  ECHO  Blood cultures  Wound culture  Continuous cardiac monitoring  Up as tolerated  Daily weights  Wean oxygen as tolerated      Scheduled Meds:   Current Facility-Administered Medications:  amLODIPine 5 mg Oral Daily   aspirin 81 mg Oral Daily   calcium carbonate 1,000 mg Oral Daily PRN   cefazolin 1,000 mg Intravenous Q12H   ferrous sulfate 325 mg Oral Daily With Breakfast   furosemide 80 mg Intravenous BID (diuretic)   heparin (porcine) 5,000 Units Subcutaneous Q8H [address]   insulin lispro 1-5 Units Subcutaneous HS   insulin lispro 1-6 Units Subcutaneous TID AC   metoprolol tartrate 12 5 mg Oral Q12H JOSUÉ   ondansetron 4 mg Intravenous Q6H PRN   pantoprazole 40 mg Oral Early Morning   potassium chloride 10 mEq Oral Daily   senna 1 tablet Oral Daily   sodium chloride 1 spray Each Nare Q2H PRN      ======================================================================  6/13/18 Cardiology Consult:    Acute on chronic combined heart failure    · An echocardiogram has been requested to update his LV function in the past has been 50 to 55%; previously up a little bit lower  · As an outpatient on Lasix 40 mg p o  b i d   · Here, on Lasix 80 mg IV b i d  and supplemental potassium 10 mEq p o  Daily     Admission weight 192, today 189 lb  Thank you,  [address] [place] [address] in the [employer]al Patrick for 2017  Network Utilization Review Department  Phone: [phone]; Fax [phone]  ATTENTION: The Network Utilization Review Department is now centralized for our 7 Facilities  Make a note that we have a new phone and fax numbers for our Department  Please call with any questions or concerns to [phone] and carefully follow the prompts so that you are directed to the right person  All voicemails are confidential  Fax any determinations, approvals, denials, and requests for initial or continue stay review clinical to [phone]  Due to HIGH CALL volume, it would be easier if you could please send faxed requests to expedite your requests and in part, help us provide discharge notifications faster

## 2018-06-13 NOTE — PLAN OF CARE
CARDIOVASCULAR - ADULT     Maintains optimal cardiac output and hemodynamic stability Progressing        DISCHARGE PLANNING     Discharge to home or other facility with appropriate resources Progressing        INFECTION - ADULT     Absence or prevention of progression during hospitalization Progressing     Absence of fever/infection during neutropenic period Progressing        Knowledge Deficit     Patient/family/caregiver demonstrates understanding of disease process, treatment plan, medications, and discharge instructions Progressing        METABOLIC, FLUID AND ELECTROLYTES - ADULT     Electrolytes maintained within normal limits Progressing     Fluid balance maintained Progressing        PAIN - ADULT     Verbalizes/displays adequate comfort level or baseline comfort level Progressing        Potential for Falls     Patient will remain free of falls Progressing        RESPIRATORY - ADULT     Achieves optimal ventilation and oxygenation Progressing        SAFETY ADULT     Maintain or return to baseline ADL function Progressing     Maintain or return mobility status to optimal level Progressing     Patient will remain free of falls Progressing

## 2018-06-13 NOTE — PROGRESS NOTES
Progress Note - Emmanuel Blood 1955, 58 y o  male MRN: [id number]    Unit/Bed#: -01 Encounter: [id number]    Primary Care Provider: Alma Solorio MD   Date and time admitted to hospital: 6/12/2018  3:20 PM    * Acute on chronic combined systolic and diastolic CHF (congestive heart failure) ([facility])   Assessment & Plan    · Increasing SOB/HUNTER, B/L LE edema, poor activity tolerance  BNP elevated at 4K  CXR with small b/l pleural effusions  · ECHO from 2015 (post CABG) shows EF of 555 with Grade 2 diastolic dysfunction  · Repeat pending  ·   Cardiology following, appreciate input  Patient currently on Lasix 80 mg IV b I d  ( home dose 40 mg b I d )  ·  strict I&Os, low-sodium diet, daily weights  Dietary consult pending for heart failure education  ·  wean oxygen as tolerated, patient not typically on O2        Cellulitis of left leg   Assessment & Plan    · L leg erythema/edema/warmth  · Treated with Keflex as an outpatient-- slightly improved  ·  continue IV Ancef while inpatient  Patient afebrile  Appreciate wound care consultation in regards to dressing changes        Acute kidney injury superimposed on chronic kidney disease ([facility] )   Assessment & Plan    · Unclear of patient's baseline-- per review of prior records his Cr appears to be anywhere between 1 7 and 1 9  ·  on admission 2 01, has improved somewhat with IV diuresis to 1 78  ·  monitor BMP closely        CAD (coronary artery disease)   Assessment & Plan    ·   Currently on aspirin 81 mg daily, metoprolol tartrate 12 5 mg p o  b I d   ·  history of cardiac catheterization August 2015 after late presentation ST elevation MI,  Had PCI  ·  also had a catheterization in 10/2015 with further PCI  ·  troponins negative x3, patient denies any chest pain currently        Hyperlipidemia   Assessment & Plan    ·  Recheck lipid panel in a [facility]   Patient reportedly took himself off of statin as he was told his cholesterol was normal   ·   I did discuss with him that he should be on a statin given the presence of stents  GERD (gastroesophageal reflux disease)   Assessment & Plan    · Continue PPI daily and Tums p r n  Type 2 diabetes mellitus with complication, with long-term current use of insulin [facility])   Assessment & Plan    Lab Results   Component Value Date    HGBA1C 9 1 (H) 06/13/2018       Recent Labs      06/13/18   0655   POCGLU  125       Blood Sugar Average: Last 72 hrs:  (P) 125     · Continue home insulin regimen + SSI for correction  · Hold Metformin while inpatient  ·  A1c 9 1, patient would benefit from endocrinology evaluation as an outpatient  Will also recommend up-titrating  Metformin at discharge and considering addition of another agent  Patient at some point will likely need insulin        Essential hypertension   Assessment & Plan    ·  Continue Norvasc 5 mg, Lasix and metoprolol          VTE Pharmacologic Prophylaxis:   Pharmacologic: Heparin  Mechanical VTE Prophylaxis in Place: No    Patient Centered Rounds: I have performed bedside rounds with nursing staff today  Discussions with Specialists or Other Care Team Provider:  [address] [place] [address], Cardiology    Education and Discussions with Family / Patient:  Patient  Time Spent for Care: 30 minutes  More than 50% of total time spent on counseling and coordination of care as described above  Current Length of Stay: 1 day(s)    Current Patient Status: Inpatient   Certification Statement: The patient will continue to require additional inpatient hospital stay due to IV diuretics, echo, heart failure workup    Discharge Plan:   Not yet medically stable  Patient states he has a flight to [place] on Friday to visit his daughter and he would really like to go  I discussed that medically he may not be ready at that point but obviously he could choose to leave against medical advice    We will reassess in the morning    Code Status: Level 3 - DNAR and DNI      Subjective: patient reports feeling slightly better than when he came in  He is still on oxygen and feels a little bit short of breath  He states that his leg pain and swelling has somewhat improved  He is anxious as he is supposed to visit his daughter on Friday in [place] and is hoping he will be able to go    Objective:     Vitals:   Temp (24hrs), Av °F (36 7 °C), Min:97 8 °F (36 6 °C), Max:98 2 °F (36 8 °C)    HR:  [63-80] 63  Resp:  [16-18] 18  BP: (128-165)/(61-79) 128/67  SpO2:  [90 %-96 %] 94 %  Body mass index is 28 76 kg/m²  Input and Output Summary (last 24 hours): Intake/Output Summary (Last 24 hours) at 18 0949  Last data filed at 18 0541   Gross per 24 hour   Intake              520 ml   Output              300 ml   Net              220 ml       Physical Exam:     Physical Exam   Constitutional: He is oriented to person, place, and time  Nasal cannula in place  Cardiovascular: Normal rate and regular rhythm  Murmur heard  Pulmonary/Chest: Effort normal  No respiratory distress  Decreased at bases   Abdominal: Soft  Bowel sounds are normal  He exhibits no distension  There is no tenderness  Musculoskeletal: He exhibits edema  Wrapped with gauze, left lower extremity slightly red and area of drainage noted   Neurological: He is alert and oriented to person, place, and time  Psychiatric:   Anxious   Nursing note and vitals reviewed  Additional Data:     Labs:      Results from last 7 days  Lab Units 18  0507  18  1559   WBC Thousand/uL 4 95  --  8 07   HEMOGLOBIN g/dL 9 9*  --  11 7*   HEMATOCRIT % 30 7*  --  35 0*   PLATELETS Thousands/uL 220  < > 263   NEUTROS PCT %  --   --  74   LYMPHS PCT %  --   --  14   MONOS PCT %  --   --  9   EOS PCT %  --   --  3   < > = values in this interval not displayed      Results from last 7 days  Lab Units 18  0507   SODIUM mmol/L 142   POTASSIUM mmol/L 4 3   CHLORIDE mmol/L 107   CO2 mmol/L 25   BUN mg/dL 36* CREATININE mg/dL 1 78*   CALCIUM mg/dL 8 6   TOTAL PROTEIN g/dL 6 7   BILIRUBIN TOTAL mg/dL 0 80   ALK PHOS U/L 70   ALT U/L 24   AST U/L 15   GLUCOSE RANDOM mg/dL 121       Results from last 7 days  Lab Units 06/12/18  1559   INR  0 97       * I Have Reviewed All Lab Data Listed Above  * Additional Pertinent Lab Tests Reviewed: All Labs Within Last 24 Hours Reviewed    Imaging:    Imaging Reports Reviewed Today Include: All available  Imaging Personally Reviewed by Myself Includes:  Chest x-ray    Recent Cultures (last 7 days):       Results from last 7 days  Lab Units 06/12/18  1552   GRAM STAIN RESULT  Rare Polys  No bacteria seen       Last 24 Hours Medication List:     Current Facility-Administered Medications:  amLODIPine 5 mg Oral Daily Salma Bernal PA-C    aspirin 81 mg Oral Daily Salma Bernal PA-C    calcium carbonate 1,000 mg Oral Daily PRN Salma Bernal PA-C    cefazolin 1,000 mg Intravenous Q12H Salma Bernal PA-C Last Rate: 1,000 mg (06/13/18 0513)   enoxaparin 40 mg Subcutaneous Daily Salma Bernal PA-C    ferrous sulfate 325 mg Oral Daily With Breakfast Salma Bernal PA-C    furosemide 80 mg Intravenous BID (diuretic) Salma Bernal PA-C    insulin lispro 1-5 Units Subcutaneous HS Darlyn Ren PA-C    insulin lispro 1-6 Units Subcutaneous TID AC Darlyn Ren PA-C    metoprolol tartrate 12 5 mg Oral Q12H [facility] Salma Bernal PA-C    ondansetron 4 mg Intravenous Q6H PRN Salma Bernal PA-C    pantoprazole 40 mg Oral Early Morning Salma Bernal PA-C    potassium chloride 10 mEq Oral Daily Salma Bernal PA-C    senna 1 tablet Oral Daily Salma Bernal PA-C    sodium chloride 1 spray Each Nare Q2H PRN Patrice Espinoza PA-C         Today, Patient Was Seen By: Amie Covarrubias PA-C    ** Please Note: Dictation voice to text software may have been used in the creation of this document   **

## 2018-06-13 NOTE — ASSESSMENT & PLAN NOTE
·   Currently on aspirin 81 mg daily, metoprolol tartrate 12 5 mg p o  b I d   ·  history of cardiac catheterization August 2015 after late presentation ST elevation MI,  Had PCI  ·  also had a catheterization in 10/2015 with further PCI  ·  troponins negative x3, patient denies any chest pain currently

## 2018-06-13 NOTE — CONSULTS
Consultation - General Cardiology   Tony Newberry 58 y o  male MRN: [id number]  Unit/Bed#: -01 Encounter: [id number]    Inpatient consult to Cardiology  Consult performed by: Anna Romero ordered by: Angela Leiva        Chief Complaint   Patient presents with    Leg Pain       pt c/o leg pain swelling and hot to touch, left leg shin area  pt c/o SOB as well able to speak in full sentences         Physician Requesting Consult: Lynn Blankenship MD  Reason for Consult / Principal Problem:  Acute heart failure    History of Present Illness   HPI: Tony Newberry is a 58y o  year old male who has a history of coronary disease, hypertension, hyperlipidemia  He  follows with cardiologist Dr Nicole Yarbrough   He was a late presentation inferior lateral STEMI August 2015 and underwent PCI with a drug-eluting stent left circumflex  He also had PCI to the RCA and diagonal with drug-eluting stents in October 2015 for residual CAD  He has chronic systolic heart failure, ejection fraction 50%  Otherwise hypertension, hypercholesterolemia and diabetes mellitus, uncontrolled in the past   In the past he had chest burning felt to be GI in etiology, as his self treatment with sodium bicarbonate improved his symptoms  He tells me he has gained about 12 lb in the last 6 months  He had been around 177 lb previously      He presented to [facility] last evening with a complaint of shortness of breath lower extremity edema and weight gain  He has been under the care visiting nurses as he has been diagnosed with left lower extremity cellulitis and has been on Keflex  According to his home medication list, he has been taking Lasix 40 mg p o  b i d  On arrival blood pressure 158/74  He was satting 95% on room air  He has not been tachypneic  In NT proBNP was greater than 4900  Serial troponins have been unremarkable  His creatinine was 2 0 on admission, BUN 39    This morning it is 1 78 and 36 respectively  A lactic acid level was elevated at 2 6  Chest x-ray:  Small bilateral pleural effusions, reviewed personally  EKG, reviewed personally:  Sinus rhythm  Nonspecific ST T wave changes inferiorly and laterally  He was diagnosed with acute on chronic combined heart failure  He was started on IV Lasix  Cardiology has been consulted  An echocardiogram has been requested  In addition he has been started on antibiotics for left lower extremity cellulitis      Echocardiogram October 2015 ejection fraction 55%  Was hypokinesis of basal-mid inferior, basal-mid inferolateral and mid anterolateral walls  There was grade 1 diastolic dysfunction and mild concentric hypertrophy  Cardiac catheterization October 2015  CORONARY CIRCULATION:  Distal LAD: There was a diffuse 40 % stenosis  1st diagonal: There was a 90 % stenosis  Mid circumflex: There was a 0 % stenosis at the site of a prior stent  Mid RCA: There was a 90 % stenosis  Right PDA: There was a 90 % stenosis  1ST LESION INTERVENTIONS:  A drug-eluting stent procedure was performed on the 90 % lesion in the 1st  diagonal  Following intervention there was a 0 % residual stenosis  A Xience  Alpine Rx 2 25 x 15mm drug-eluting stent was placed across the lesion and  deployed at a maximum inflation pressure of 10 merced      2ND LESION INTERVENTIONS:  A drug-eluting stent procedure was performed on the 90 % lesion in the mid RCA  Following intervention there was a 0 % residual stenosis  A Xience Alpine Rx  3 0 x 12mm drug-eluting stent was placed across the lesion and deployed at a  maximum inflation pressure of 12 merced      4TH LESION INTERVENTIONS:  A percutaneous intervention procedure was performed on the 90 % lesion in the  right posterior descending artery  Following intervention there was an improved  angiographic appearance with a 40 % residual stenosis      Cath 8/15  IMPRESSIONS:   PCI of proximal LCX and OM1 branch for late presentation STEMI (trop >50 on   admission) with continued angina  Residual disease in diagonal and proximal    RCA   which appears amenable to PCI  The rPDA and distal LAD branches are quite    small   and disease appears to be best treated with medical management  RECOMMENDATIONS   asa indefinitely, effient 10, high potent statin, bbl, ACEI, echo  Review of Systems   Constitution: Positive for weakness and malaise/fatigue  Negative for chills and fever  Cardiovascular: Positive for dyspnea on exertion and leg swelling  Negative for chest pain, claudication, cyanosis, irregular heartbeat, near-syncope, orthopnea, palpitations, paroxysmal nocturnal dyspnea and syncope  Respiratory: Positive for shortness of breath  Negative for cough  Gastrointestinal: Negative for anorexia and nausea  Neurological: Negative for dizziness, focal weakness, headaches and light-headedness  All other systems reviewed and are negative  Historical Information   Past Medical History:   Diagnosis Date    [facility] mellitus ([facility] [address] )     Hypertension     Wound, open, foot      Past Surgical History:   Procedure Laterality Date    CORONARY ARTERY BYPASS GRAFT      x4 stents     DENTAL SURGERY      implant    RHINOPLASTY       History   Alcohol Use No     History   Drug Use No     History   Smoking Status    Never Smoker   Smokeless Tobacco    Never Used     Family History: History reviewed  No pertinent family history      Meds/Allergies   all current active meds have been reviewed, current meds:   Current Facility-Administered Medications   Medication Dose Route Frequency    amLODIPine (NORVASC) tablet 5 mg  5 mg Oral Daily    aspirin chewable tablet 81 mg  81 mg Oral Daily    calcium carbonate (TUMS) chewable tablet 1,000 mg  1,000 mg Oral Daily PRN    ceFAZolin (ANCEF) IVPB (premix) 1,000 mg  1,000 mg Intravenous Q12H    enoxaparin (LOVENOX) subcutaneous injection 40 mg  40 mg Subcutaneous Daily    ferrous sulfate tablet 325 mg  325 mg Oral Daily With Breakfast    furosemide (LASIX) injection 80 mg  80 mg Intravenous BID (diuretic)    metoprolol tartrate (LOPRESSOR) partial tablet 12 5 mg  12 5 mg Oral Q12H [address]    ondansetron (ZOFRAN) injection 4 mg  4 mg Intravenous Q6H PRN    pantoprazole (PROTONIX) EC tablet 40 mg  40 mg Oral Early Morning    potassium chloride (K-DUR,KLOR-CON) CR tablet 10 mEq  10 mEq Oral Daily    senna (SENOKOT) tablet 8 6 mg  1 tablet Oral Daily    sodium chloride (OCEAN) 0 65 % nasal spray 1 spray  1 spray Each Nare Q2H PRN    and PTA meds:   Prior to Admission Medications   Prescriptions Last Dose Informant Patient Reported? Taking?    B Complex Vitamins (VITAMIN B COMPLEX PO)  Self Yes Yes   Sig: Take by mouth   Insulin Aspart (NOVOLOG SC)  Self Yes Yes   Sig: Inject under the skin   Insulin Glargine (LANTUS SOLOSTAR) 100 UNIT/ML SOPN  Self Yes Yes   Sig: Inject under the skin   Multiple Vitamin (ONE-A-DAY MENS PO)  Self Yes Yes   Sig: Take by mouth daily   amLODIPine (NORVASC) 5 mg tablet  Self Yes Yes   Sig: Take 5 mg by mouth daily   aspirin 81 MG tablet  Self Yes Yes   Sig: Take by mouth   cephalexin (KEFLEX) 500 mg capsule  Self Yes Yes   Sig: Take 500 mg by mouth every 6 (six) hours   ferrous sulfate 325 (65 Fe) mg tablet  Self Yes Yes   Sig: Take 325 mg by mouth daily with breakfast   furosemide (LASIX) 40 mg tablet  Self Yes Yes   Sig: Take by mouth 2 (two) times a day     metFORMIN (GLUCOPHAGE) 500 mg tablet  Self Yes Yes   Sig: Take 500 mg by mouth 2 (two) times a day with meals   metoprolol tartrate (LOPRESSOR) 25 mg tablet  Self Yes Yes   Sig: Take 12 5 mg by mouth every 12 (twelve) hours     omeprazole (PriLOSEC) 20 mg delayed release capsule  Self Yes Yes   Sig: Take 20 mg by mouth 2 (two) times a day   potassium chloride (K-DUR,KLOR-CON) 10 mEq tablet  Self Yes Yes   Sig: Take by mouth      Facility-Administered Medications: None          Allergies   Allergen Reactions    Amoxicillin Swelling    Lisinopril Cough       Objective   Vitals: Blood pressure 128/67, pulse 63, temperature 98 °F (36 7 °C), temperature source Oral, resp  rate 18, height 5' 8" (1 727 m), weight 85 8 kg (189 lb 2 5 oz), SpO2 94 %  ,     Body mass index is 28 76 kg/m²  ,     Systolic (20GSK), MRR:458 , Min:128 , YUD:030     Diastolic (64WVR), ZKP:98, Min:61, Max:79            Intake/Output Summary (Last 24 hours) at 06/13/18 0816  Last data filed at 06/13/18 0541   Gross per 24 hour   Intake              520 ml   Output              300 ml   Net              220 ml     Weight (last 2 days)     Date/Time   Weight    06/13/18 0546  85 8 (189 15)    06/12/18 1900  87 5 (192 9)    06/12/18 1452  86 2 (190)            Invasive Devices     Peripheral Intravenous Line            Peripheral IV 06/12/18 Left Antecubital less than 1 day                  Physical Exam   Constitutional: He is oriented to person, place, and time  No distress  HENT:   Head: Normocephalic and atraumatic  Eyes: Pupils are equal, round, and reactive to light  Neck: Normal range of motion  Neck supple  JVD present  Cardiovascular: Normal rate, regular rhythm and intact distal pulses  Murmur heard  Crescendo systolic murmur is present with a grade of 2/6   Pulmonary/Chest: Effort normal  He has decreased breath sounds  Abdominal: Soft  Bowel sounds are normal    Musculoskeletal: He exhibits edema  His legs are wrapped with gauze   Neurological: He is alert and oriented to person, place, and time  Skin: Skin is warm and dry  He is not diaphoretic  Psychiatric: He has a normal mood and affect  Nursing note and vitals reviewed          LABORATORY RESULTS:    Results from last 7 days  Lab Units 06/12/18  2327 06/12/18 2029 06/12/18  1559   TROPONIN I ng/mL <0 02 <0 02 <0 02     CBC with diff:   Results from last 7 days  Lab Units 06/13/18  0507 06/12/18 2029 06/12/18  1559   WBC Thousand/uL 4 95  --  8 07 HEMOGLOBIN g/dL 9 9*  --  11 7*   HEMATOCRIT % 30 7*  --  35 0*   MCV fL 94  --  93   PLATELETS Thousands/uL 220 232 263   MCH pg 30 3  --  31 0   MCHC g/dL 32 2  --  33 4   RDW % 14 7  --  14 7   MPV fL 10 1 9 8 10 2       CMP:  Results from last 7 days  Lab Units 06/13/18  0507 06/12/18  1559   SODIUM mmol/L 142 139   POTASSIUM mmol/L 4 3 4 1   CHLORIDE mmol/L 107 104   CO2 mmol/L 25 24   ANION GAP mmol/L 10 11   BUN mg/dL 36* 39*   CREATININE mg/dL 1 78* 2 01*   GLUCOSE RANDOM mg/dL 121 104   CALCIUM mg/dL 8 6 9 1   AST U/L 15 19   ALT U/L 24 33   ALK PHOS U/L 70 86   TOTAL PROTEIN g/dL 6 7 8 5*   BILIRUBIN TOTAL mg/dL 0 80 1 20*   EGFR ml/min/1 73sq m 39 34       BMP:  Results from last 7 days  Lab Units 06/13/18  0507 06/12/18  1559   SODIUM mmol/L 142 139   POTASSIUM mmol/L 4 3 4 1   CHLORIDE mmol/L 107 104   CO2 mmol/L 25 24   BUN mg/dL 36* 39*   CREATININE mg/dL 1 78* 2 01*   GLUCOSE RANDOM mg/dL 121 104   CALCIUM mg/dL 8 6 9 1          Lab Results   Component Value Date    NTBNP 4,974 (H) 06/12/2018            Results from last 7 days  Lab Units 06/13/18  0507   MAGNESIUM mg/dL 2 4                       Results from last 7 days  Lab Units 06/12/18  1559   INR  0 97     Lipid Profile:   Lab Results   Component Value Date    CHOL 126 06/13/2018    CHOL 72 01/30/2017    CHOL 86 04/14/2016     Lab Results   Component Value Date    HDL 42 06/13/2018    HDL 43 01/30/2017    HDL 50 04/14/2016     Lab Results   Component Value Date    LDLCALC 75 06/13/2018    LDLCALC 3 01/30/2017    LDLCALC 18 04/14/2016     Lab Results   Component Value Date    TRIG 46 06/13/2018    TRIG 129 01/30/2017    TRIG 89 04/14/2016         Cardiac testing:   Results for orders placed in visit on 10/27/15   Echo complete with contrast if indicated    Narrative [facility], [address] [place] [address]  [phone]    Transthoracic Echocardiogram  2D, M-mode, Doppler, and Color Doppler    Study date: 30-Oct-2015    Patient: Kandis Patton  MR number: [id number]  Account number: [de-identified]  : 1955  Age: 63 years  Gender: Male  Status: Outpatient  Location: [address] [place] [address]  Height: 68 in  Weight: 157 7 lb  BP: 118/ 78 mmHg    Indications: Coronary Artery Disease, Congestive heart Failure    Diagnoses: I25 10 - Atherosclerotic heart disease of native coronary artery  without angina pectoris, B05 70 - Chronic systolic (congestive) heart failure    Sonographer:  Radha Campo  Primary Physician:  Kallie Heimlich, MD  Referring Physician:  Chaparrita Paul DO  Group:  [facility]  Interpreting Physician:  Marbella Kennedy DO    SUMMARY    LEFT VENTRICLE:  Systolic function was normal  Ejection fraction was estimated to be 55 %  There was hypokinesis of the basal-mid inferior, basal-mid inferolateral, and  mid anterolateral wall(s)  Wall thickness was mildly increased  There was mild concentric hypertrophy  Doppler parameters were consistent with abnormal left ventricular relaxation  (grade 1 diastolic dysfunction)  LEFT ATRIUM:  The atrium was mildly dilated  RIGHT ATRIUM:  The atrium was mildly dilated  MITRAL VALVE:  There was mild regurgitation  TRICUSPID VALVE:  There was mild regurgitation  PULMONIC VALVE:  There was trace regurgitation  IVC, HEPATIC VEINS:  The respirophasic change in diameter was less than 50%  HISTORY: PRIOR HISTORY: Coronary Artery Disease/MI 8-15/Stent 8-15/ Stent  10-15/PTCA 10-15, Hyperlipidemia, Congestive Heart Failure, Chronic Renal  Disease, DM2, Former Smoker, Family Hx  CAD    PROCEDURE: The study was performed in the [address] [place] [address]  This was a routine study  The transthoracic approach was used  The study  included complete 2D imaging, M-mode, complete spectral Doppler, and color  Doppler  Image quality was adequate      LEFT VENTRICLE: Size was normal  Systolic function was normal  Ejection  fraction was estimated to be 55 %  There was hypokinesis of the basal-mid  inferior, basal-mid inferolateral, and mid anterolateral wall(s)  Wall  thickness was mildly increased  There was mild concentric hypertrophy  DOPPLER:  Doppler parameters were consistent with abnormal left ventricular relaxation  (grade 1 diastolic dysfunction)  RIGHT VENTRICLE: The size was normal  Systolic function was normal  Wall  thickness was normal     LEFT ATRIUM: The atrium was mildly dilated  RIGHT ATRIUM: The atrium was mildly dilated  MITRAL VALVE: Valve structure was normal  There was normal leaflet separation  DOPPLER: The transmitral velocity was within the normal range  There was no  evidence for stenosis  There was mild regurgitation  AORTIC VALVE: The valve was trileaflet  Leaflets exhibited normal thickness,  normal cuspal separation, and sclerosis  DOPPLER: Transaortic velocity was  within the normal range  There was no evidence for stenosis  There was no  regurgitation  TRICUSPID VALVE: The valve structure was normal  There was normal leaflet  separation  DOPPLER: The transtricuspid velocity was within the normal range  There was no evidence for stenosis  There was mild regurgitation  The tricuspid  jet envelope definition was inadequate for estimation of RV systolic pressure  There are no indirect findings (abnormal RV volume or geometry, altered  pulmonary flow velocity profile, or leftward septal displacement) which would  suggest moderate or severe pulmonary hypertension  PULMONIC VALVE: Leaflets exhibited normal thickness, no calcification, and  normal cuspal separation  DOPPLER: The transpulmonic velocity was within the  normal range  There was trace regurgitation  PERICARDIUM: There was no pericardial effusion  The pericardium was normal in  appearance  AORTA: The root exhibited normal size      SYSTEMIC VEINS: IVC: The respirophasic change in diameter was less than 50%     SYSTEM MEASUREMENT TABLES    2D  %FS: 23 12 %  AV Diam: 3 31 cm  EDV(Teich): 70 68 ml  EF(Cube): 54 56 %  EF(Teich): 46 87 %  ESV(Cube): 29 44 ml  ESV(Teich): 37 55 ml  IVSd: 1 41 cm  LA Area: 20 23 cm2  LA Diam: 3 47 cm  LVEDV MOD A4C: 132 29 ml  LVEF MOD A4C: 50 9 %  LVESV MOD A4C: 64 95 ml  LVIDd: 4 02 cm  LVIDs: 3 09 cm  LVLd A4C: 8 35 cm  LVLs A4C: 6 77 cm  LVPWd: 1 31 cm  RA Area: 20 83 cm2  RV Diam: 4 11 cm  SI(Cube): 19 11 ml/m2  SI(Teich): 17 91 ml/m2  SV MOD A4C: 67 34 ml  SV(Cube): 35 35 ml  SV(Teich): 33 13 ml    CW  TR MaxP 92 mmHg  TR Vmax: 2 63 m/s    MM  TAPSE: 2 04 cm    PW  E': 0 06 m/s  E/E': 15 88  MV A Dago: 1 08 m/s  MV Dec Hernando: 4 24 m/s2  MV DecT: 212 83 ms  MV E Dago: 0 9 m/s  MV E/A Ratio: 0 84    Inters\[facility]\"" Commission Accredited Echocardiography Laboratory    Prepared and electronically signed by    Nitin Krause DO  Signed 30-Oct-2015 11:06:09               Imaging: I have personally reviewed pertinent reports  and I have personally reviewed pertinent films in PACS  Xr Chest 2 Views  Result Date: 2018  Narrative: CHEST INDICATION:   dyspnea  Cough  COMPARISON:  2017  EXAM PERFORMED/VIEWS:  XR CHEST PA & LATERAL FINDINGS: Cardiomediastinal silhouette appears unremarkable  No infiltrates  There are small bilateral pleural effusions  Osseous structures appear within normal limits for patient age  Impression: Small bilateral pleural effusions  Workstation performed: [id number]     Us Abdomen Limited  Result Date: 2018  Narrative: Ultrasound abdomen limited INDICATION:    ASCITES  COMPARISON:  None  TECHNIQUE:   Real-time ultrasound of the 4 quadrants of the abdomen ultrasound was performed with a curvilinear transducer with both volumetric sweeps and still imaging techniques  FINDINGS: No ascites  Impression: No ascites   Workstation performed: [id number]           Telemetry reviewed personally:   Sinus rhythm 60 70    Assessment  Principal Problem:    Acute on chronic combined systolic and diastolic CHF (congestive heart failure) ([facility])  Active Problems:    Essential hypertension    Type 2 diabetes mellitus with complication, with long-term current use of insulin (HCC)    ALESIA (acute kidney injury) ([facility])    GERD (gastroesophageal reflux disease)    Cellulitis of left leg      Assessment/ Plan  Acute on chronic combined heart failure  · An echocardiogram has been requested to update his LV function in the past has been 50 to 55%; previously up a little bit lower  · As an outpatient on Lasix 40 mg p o  b i d   · Here, on Lasix 80 mg IV b i d  and supplemental potassium 10 mEq p o  Daily  His I and O appears inaccurate  He is net positive 220 mL since admission  I reinforced the need for accurate I&Os with nursing staff and daily weights  Admission weight 192, today 189 lb  · Will consult a dietitian to review importance of adherence to a low-sodium diet  CKD 3  His baseline is unclear but appears to be around 1 7 to 1 9  His renal function is improving with diuresis some  Creatinine on admission 2; today 1 78  CAD  · On aspirin 81 daily, metoprolol tartrate 12 5 mg p o  q 12 hours  Hypertension, average 155/74  · On Norvasc 5 mg daily and low-dose metoprolol as well as a loop diuretic  Will monitor with diuresis  If needed we will titrate antihypertensives  Hyperlipidemia-currently not on a statin  Per the patient, he stopped statin therapy on his own because his lipid profile was satisfactory  I tried to educate him  He feels confident that his disease has been treated with stents  He is agreeable to reassessing his lipid profile and having a rediscussion tomorrow  Lower extremity cellulitis-per slim, on antibiotics         Thank you for allowing us to participate in this patient's care  This pt will follow up with Dr Kindra Ayala   once discharged  Counseling / Coordination of Care  Total floor / unit time spent today  45 minutes    Greater than 50% of total time was spent with the patient and / or family counseling and / or coordination of care  A description of the counseling / coordination of care: Review of history, current assessment, development of a plan  Code Status: Level 3 - DNAR and DNI    ** Please Note: Dragon 360 Dictation voice to text software may have been used in the creation of this document   **

## 2018-06-13 NOTE — ASSESSMENT & PLAN NOTE
Lab Results   Component Value Date    HGBA1C 9 1 (H) 06/13/2018       Recent Labs      06/13/18   0655   POCGLU  125       Blood Sugar Average: Last 72 hrs:  (P) 125     · Continue home insulin regimen + SSI for correction  · Hold Metformin while inpatient  ·  A1c 9 1, patient would benefit from endocrinology evaluation as an outpatient  Will also recommend up-titrating  Metformin at discharge and considering addition of another agent    Patient at some point will likely need insulin

## 2018-06-13 NOTE — SOCIAL WORK
CM met with Pt at bedside  Pt lives in a 2 level home with 1 ROSALVA  Pt is currently living alone because his wife is currently staying with her sister due to medical conditions  Pt uses a "hurry-cane"  Pt is independent with ambulation and ADL's  Pt is current with [facility] for wound care, CM submitted CHANELL  Pt does not have a history of Rehab  [facility] [address]  Pt denies any MH or D&A history  Pt does not have a POA and does not want info  Pt's friend transport Pt to appointments  CM name and role reviewed and Discharge Checklist provided         CM dept will follow Pt until discharge

## 2018-06-13 NOTE — ASSESSMENT & PLAN NOTE
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 Patient responding slowly to Tacrolimus ,but issue is not resolved

## 2018-06-14 VITALS
HEART RATE: 62 BPM | WEIGHT: 183.42 LBS | RESPIRATION RATE: 16 BRPM | OXYGEN SATURATION: 96 % | BODY MASS INDEX: 27.8 KG/M2 | DIASTOLIC BLOOD PRESSURE: 68 MMHG | SYSTOLIC BLOOD PRESSURE: 137 MMHG | TEMPERATURE: 97.7 F | HEIGHT: 68 IN

## 2018-06-14 LAB
ANION GAP SERPL CALCULATED.3IONS-SCNC: 10 MMOL/L (ref 4–13)
BUN SERPL-MCNC: 40 MG/DL (ref 5–25)
CALCIUM SERPL-MCNC: 9.3 MG/DL (ref 8.3–10.1)
CHLORIDE SERPL-SCNC: 103 MMOL/L (ref 100–108)
CHOLEST SERPL-MCNC: 141 MG/DL (ref 50–200)
CO2 SERPL-SCNC: 24 MMOL/L (ref 21–32)
CREAT SERPL-MCNC: 1.98 MG/DL (ref 0.6–1.3)
GFR SERPL CREATININE-BSD FRML MDRD: 35 ML/MIN/1.73SQ M
GLUCOSE SERPL-MCNC: 150 MG/DL (ref 65–140)
GLUCOSE SERPL-MCNC: 163 MG/DL (ref 65–140)
GLUCOSE SERPL-MCNC: 176 MG/DL (ref 65–140)
GLUCOSE SERPL-MCNC: 241 MG/DL (ref 65–140)
HDLC SERPL-MCNC: 41 MG/DL (ref 40–60)
LDLC SERPL CALC-MCNC: 82 MG/DL (ref 0–100)
MAGNESIUM SERPL-MCNC: 2.5 MG/DL (ref 1.6–2.6)
POTASSIUM SERPL-SCNC: 4.6 MMOL/L (ref 3.5–5.3)
SODIUM SERPL-SCNC: 137 MMOL/L (ref 136–145)
TRIGL SERPL-MCNC: 91 MG/DL

## 2018-06-14 PROCEDURE — 99232 SBSQ HOSP IP/OBS MODERATE 35: CPT | Performed by: INTERNAL MEDICINE

## 2018-06-14 PROCEDURE — 80061 LIPID PANEL: CPT | Performed by: NURSE PRACTITIONER

## 2018-06-14 PROCEDURE — 82948 REAGENT STRIP/BLOOD GLUCOSE: CPT

## 2018-06-14 PROCEDURE — 93306 TTE W/DOPPLER COMPLETE: CPT | Performed by: INTERNAL MEDICINE

## 2018-06-14 PROCEDURE — 80048 BASIC METABOLIC PNL TOTAL CA: CPT | Performed by: NURSE PRACTITIONER

## 2018-06-14 PROCEDURE — 99239 HOSP IP/OBS DSCHRG MGMT >30: CPT | Performed by: INTERNAL MEDICINE

## 2018-06-14 PROCEDURE — 83735 ASSAY OF MAGNESIUM: CPT | Performed by: NURSE PRACTITIONER

## 2018-06-14 RX ORDER — FUROSEMIDE 80 MG
80 TABLET ORAL 2 TIMES DAILY
Qty: 60 TABLET | Refills: 0 | Status: SHIPPED | OUTPATIENT
Start: 2018-06-14

## 2018-06-14 RX ORDER — LOSARTAN POTASSIUM 25 MG/1
25 TABLET ORAL DAILY
Qty: 30 TABLET | Refills: 0 | Status: SHIPPED | OUTPATIENT
Start: 2018-06-14

## 2018-06-14 RX ORDER — CEPHALEXIN 500 MG/1
500 CAPSULE ORAL EVERY 12 HOURS SCHEDULED
Qty: 10 CAPSULE | Refills: 0 | Status: SHIPPED | OUTPATIENT
Start: 2018-06-14 | End: 2018-06-19

## 2018-06-14 RX ORDER — ATORVASTATIN CALCIUM 20 MG/1
20 TABLET, FILM COATED ORAL DAILY
Qty: 30 TABLET | Refills: 0 | Status: SHIPPED | OUTPATIENT
Start: 2018-06-14

## 2018-06-14 RX ADMIN — HEPARIN SODIUM 5000 UNITS: 5000 INJECTION, SOLUTION INTRAVENOUS; SUBCUTANEOUS at 14:59

## 2018-06-14 RX ADMIN — CEFAZOLIN SODIUM 1000 MG: 1 SOLUTION INTRAVENOUS at 05:58

## 2018-06-14 RX ADMIN — HEPARIN SODIUM 5000 UNITS: 5000 INJECTION, SOLUTION INTRAVENOUS; SUBCUTANEOUS at 05:58

## 2018-06-14 RX ADMIN — INSULIN LISPRO 3 UNITS: 100 INJECTION, SOLUTION INTRAVENOUS; SUBCUTANEOUS at 12:12

## 2018-06-14 RX ADMIN — FUROSEMIDE 80 MG: 10 INJECTION, SOLUTION INTRAMUSCULAR; INTRAVENOUS at 05:58

## 2018-06-14 RX ADMIN — POTASSIUM CHLORIDE 20 MEQ: 1500 TABLET, EXTENDED RELEASE ORAL at 08:49

## 2018-06-14 RX ADMIN — FUROSEMIDE 80 MG: 10 INJECTION, SOLUTION INTRAMUSCULAR; INTRAVENOUS at 13:05

## 2018-06-14 RX ADMIN — INSULIN LISPRO 1 UNITS: 100 INJECTION, SOLUTION INTRAVENOUS; SUBCUTANEOUS at 08:50

## 2018-06-14 RX ADMIN — PANTOPRAZOLE SODIUM 40 MG: 40 TABLET, DELAYED RELEASE ORAL at 05:58

## 2018-06-14 RX ADMIN — METOPROLOL TARTRATE 12.5 MG: 25 TABLET ORAL at 08:49

## 2018-06-14 RX ADMIN — FERROUS SULFATE TAB 325 MG (65 MG ELEMENTAL FE) 325 MG: 325 (65 FE) TAB at 08:48

## 2018-06-14 RX ADMIN — ASPIRIN 81 MG 81 MG: 81 TABLET ORAL at 08:49

## 2018-06-14 RX ADMIN — AMLODIPINE BESYLATE 5 MG: 5 TABLET ORAL at 08:49

## 2018-06-14 NOTE — PROGRESS NOTES
Progress Note - Josh Andino 1955, 58 y o  male MRN: [id number]    Unit/Bed#: -01 Encounter: [id number]    Primary Care Provider: Lidia Flores MD   Date and time admitted to hospital: 6/12/2018  3:20 PM    * Acute on chronic combined systolic and diastolic CHF (congestive heart failure) ([facility])   Assessment & Plan    · Increasing SOB/HUNTER, B/L LE edema, poor activity tolerance  BNP elevated at 4K  CXR with small b/l pleural effusions  · ECHO from 2015 (post CABG) shows EF of 555 with Grade 2 diastolic dysfunction  · Repeat pending  ·   Cardiology following, appreciate input  Patient currently on Lasix 80 mg IV TID with diuresis of 3L ( home dose 40 mg b I d )  ·  strict I&Os, low-sodium diet, daily weights  Dietary consult pending for heart failure education  · Oxygen successfully weaned off   · Should be on ace-inhibitor given CHF but reports hx of severe cough with ace, would recommend consideration of ARB        Cellulitis of left leg   Assessment & Plan    · L leg erythema/edema/warmth  · Treated with Keflex as an outpatient-- slightly improved  ·  continue IV Ancef while inpatient  Patient afebrile    Appreciate wound care consultation in regards to dressing changes        Acute kidney injury superimposed on chronic kidney disease ([facility] [address] )   Assessment & Plan    · Unclear of patient's baseline-- per review of prior records his Cr appears to be anywhere between 1 7 and 1 9  ·  on admission 2 01, had initially improved to 1 78 with some diuresis but bumped again 1 98 today  ·  monitor BMP closely        CAD (coronary artery disease)   Assessment & Plan    ·   Currently on aspirin 81 mg daily, metoprolol tartrate 12 5 mg p o  B I d; agreeable to restarting statin  ·  history of cardiac catheterization August 2015 after late presentation ST elevation MI,  Had PCI  ·  also had a catheterization in 10/2015 with further PCI  ·  troponins negative x3, patient denies any chest pain currently Hyperlipidemia   Assessment & Plan    ·  Recheck lipid panel in a m  Denia Velasquez Patient reportedly took himself off of statin as he was told his cholesterol was normal   ·   I did discuss with him that he should be on a statin given the presence of stents  · He is agreeable to starting again at discharge        GERD (gastroesophageal reflux disease)   Assessment & Plan    · Continue PPI daily and Tums p r n  Type 2 diabetes mellitus with complication, with long-term current use of insulin [facility])   Assessment & Plan    Lab Results   Component Value Date    HGBA1C 9 1 (H) 06/13/2018       Recent Labs      06/13/18   1043  06/13/18   1558  06/13/18   2109  06/14/18   0727   POCGLU  271*  139  194*  163*       Blood Sugar Average: Last 72 hrs:  (P) 178 4     · Continue home insulin regimen + SSI for correction  · Hold Metformin while inpatient  ·  A1c 9 1, patient would benefit from endocrinology evaluation as an outpatient  Will also recommend up-titrating  Metformin at discharge and considering addition of another agent  Patient at some point will likely need insulin        Essential hypertension   Assessment & Plan    ·  Continue Norvasc 5 mg, Lasix and metoprolol  · Consider adding ARB given CHF          VTE Pharmacologic Prophylaxis:   Pharmacologic: Heparin  Mechanical VTE Prophylaxis in Place: No    Patient Centered Rounds: I have performed bedside rounds with nursing staff today  Discussions with Specialists or Other Care Team Provider: appreciate cards input  Education and Discussions with Family / Patient: patient  Time Spent for Care: 30 minutes  More than 50% of total time spent on counseling and coordination of care as described above  Current Length of Stay: 2 day(s)    Current Patient Status: Inpatient   Certification Statement: The patient will continue to require additional inpatient hospital stay due to Diuresis      Discharge Plan:  Patient hopeful to be later today as he has a flight to [place] tomorrow morning to visit his daughter  Will appreciate Cardiology consultation and input regarding this  Code Status: Level 3 - DNAR and DNI      Subjective:   Patient reports feeling better today  His legs are less swollen  He feels less short of breath at rest but has not walked yet to assess this  I encouraged him to go for a walk this morning  We discussed fluid and dietary compliance  Objective:     Vitals:   Temp (24hrs), Av 1 °F (36 7 °C), Min:97 7 °F (36 5 °C), Max:98 4 °F (36 9 °C)    HR:  [62-66] 62  Resp:  [16] 16  BP: (122-133)/(62-67) 124/67  SpO2:  [91 %-96 %] 96 %  Body mass index is 27 89 kg/m²  Input and Output Summary (last 24 hours): Intake/Output Summary (Last 24 hours) at 18 0957  Last data filed at 18 0936   Gross per 24 hour   Intake             1500 ml   Output             5000 ml   Net            -3500 ml       Physical Exam:     Physical Exam   Constitutional: He is oriented to person, place, and time  No distress  Cardiovascular: Normal rate and regular rhythm  Pulmonary/Chest: Effort normal  No respiratory distress  He has no wheezes  Abdominal: Soft  Bowel sounds are normal  He exhibits no distension  There is no tenderness  Musculoskeletal: He exhibits edema  Neurological: He is alert and oriented to person, place, and time  Skin: There is erythema  Psychiatric: He has a normal mood and affect  Nursing note and vitals reviewed  Additional Data:     Labs:      Results from last 7 days  Lab Units 18  0507  18  1559   WBC Thousand/uL 4 95  --  8 07   HEMOGLOBIN g/dL 9 9*  --  11 7*   HEMATOCRIT % 30 7*  --  35 0*   PLATELETS Thousands/uL 220  < > 263   NEUTROS PCT %  --   --  74   LYMPHS PCT %  --   --  14   MONOS PCT %  --   --  9   EOS PCT %  --   --  3   < > = values in this interval not displayed      Results from last 7 days  Lab Units 18  0533 18  0507   SODIUM mmol/L 137 142   POTASSIUM mmol/L 4 6 4 3   CHLORIDE mmol/L 103 107   CO2 mmol/L 24 25   BUN mg/dL 40* 36*   CREATININE mg/dL 1 98* 1 78*   CALCIUM mg/dL 9 3 8 6   TOTAL PROTEIN g/dL  --  6 7   BILIRUBIN TOTAL mg/dL  --  0 80   ALK PHOS U/L  --  70   ALT U/L  --  24   AST U/L  --  15   GLUCOSE RANDOM mg/dL 150* 121       Results from last 7 days  Lab Units 06/12/18  1559   INR  0 97       * I Have Reviewed All Lab Data Listed Above  * Additional Pertinent Lab Tests Reviewed: All Labs Within Last 24 Hours Reviewed    Imaging:    Imaging Reports Reviewed Today Include: none  Imaging Personally Reviewed by Myself Includes:  none    Recent Cultures (last 7 days):       Results from last 7 days  Lab Units 06/12/18  1559 06/12/18  1558 06/12/18  1552   BLOOD CULTURE  No Growth at 24 hrs   No Growth at 24 hrs   --    GRAM STAIN RESULT   --   --  Rare Polys  No bacteria seen   WOUND CULTURE   --   --  Culture too young- will reincubate       Last 24 Hours Medication List:     Current Facility-Administered Medications:  amLODIPine 5 mg Oral Daily Salma Bernal PA-C    aspirin 81 mg Oral Daily Salma Bernal PA-C    calcium carbonate 1,000 mg Oral Daily PRN Salma Bernal PA-C    cefazolin 1,000 mg Intravenous Q12H Salma Bernal PA-C Last Rate: 1,000 mg (06/14/18 0558)   ferrous sulfate 325 mg Oral Daily With Breakfast Salma Bernal PA-C    furosemide 80 mg Intravenous TID (diuretic) Yariel Alford MD    heparin (porcine) 5,000 Units Subcutaneous Q8H [address] Darlyn Ren PA-C    insulin lispro 1-5 Units Subcutaneous HS Darlyn Ren PA-C    insulin lispro 1-6 Units Subcutaneous TID AC Darlyn Ren PA-C    metoprolol tartrate 12 5 mg Oral Q12H [address] Salma Bernal PA-C    ondansetron 4 mg Intravenous Q6H PRN Salma Bernal PA-C    pantoprazole 40 mg Oral Early Morning Salma Bernal PA-C    potassium chloride 20 mEq Oral BID Yariel Alford MD    senna 1 tablet Oral Daily Salma Bernal PA-C sodium chloride 1 spray Each Nare Q2H PRN Marcos Jacobs PA-C         Today, Patient Was Seen By: Solo Lau PA-C    ** Please Note: Dictation voice to text software may have been used in the creation of this document   **

## 2018-06-14 NOTE — ASSESSMENT & PLAN NOTE
Lab Results   Component Value Date    HGBA1C 9 1 (H) 06/13/2018       Recent Labs      06/13/18   1043  06/13/18   1558  06/13/18   2109  06/14/18   0727   POCGLU  271*  139  194*  163*       Blood Sugar Average: Last 72 hrs:  (P) 178 4     · Continue home insulin regimen + SSI for correction  · Hold Metformin while inpatient  ·  A1c 9 1, patient would benefit from endocrinology evaluation as an outpatient  Will also recommend up-titrating  Metformin at discharge and considering addition of another agent    Patient at some point will likely need insulin

## 2018-06-14 NOTE — ASSESSMENT & PLAN NOTE
· Increasing SOB/HUNTER, B/L LE edema, poor activity tolerance  BNP elevated at 4K  CXR with small b/l pleural effusions  · ECHO from 2015 (post CABG) shows EF of 555 with Grade 2 diastolic dysfunction  · Repeat EF 05%, grade 2 diastolic dysfunction, mild valvular abnormalities  See full report for details  ·   Cardiology following, appreciate input  Patient diuresed well with IV Lasix, Cardiology recommends discharging on p o  Lasix 80 mg b i d  patient educated on importance of strict I&Os, low-sodium diet, daily weights    Dietary consult appreciated, heart failure instructions  · Oxygen successfully weaned off   · Patient reports intolerance to Ace inhibitors in the past secondary to a cough, will start him on low-dose Arb, Cozaar 25 mg daily per Cardiology recommendation  · Patient has script for repeat blood work Monday 6/18, and has appointment with heart failure Dr Mila Limon

## 2018-06-14 NOTE — PLAN OF CARE
CARDIOVASCULAR - ADULT     Maintains optimal cardiac output and hemodynamic stability Progressing        DISCHARGE PLANNING     Discharge to home or other facility with appropriate resources Progressing        DISCHARGE PLANNING - CARE MANAGEMENT     Discharge to post-acute care or home with appropriate resources Progressing        INFECTION - ADULT     Absence or prevention of progression during hospitalization Progressing        Knowledge Deficit     Patient/family/caregiver demonstrates understanding of disease process, treatment plan, medications, and discharge instructions Progressing        METABOLIC, FLUID AND ELECTROLYTES - ADULT     Electrolytes maintained within normal limits Progressing     Fluid balance maintained Progressing        Nutrition/Hydration-ADULT     Nutrient/Hydration intake appropriate for improving, restoring or maintaining nutritional needs Progressing        PAIN - ADULT     Verbalizes/displays adequate comfort level or baseline comfort level Progressing        Potential for Falls     Patient will remain free of falls Progressing        RESPIRATORY - ADULT     Achieves optimal ventilation and oxygenation Progressing        SAFETY ADULT     Maintain or return to baseline ADL function Progressing     Maintain or return mobility status to optimal level Progressing     Patient will remain free of falls Progressing

## 2018-06-14 NOTE — ASSESSMENT & PLAN NOTE
· Unclear of patient's baseline-- per review of prior records his Cr appears to be anywhere between 1 7 and 1 9  ·  on admission 2 01, had initially improved to 1 78 with some diuresis but bumped again 1 98 today  ·  monitor BMP closely

## 2018-06-14 NOTE — ASSESSMENT & PLAN NOTE
Lab Results   Component Value Date    HGBA1C 9 1 (H) 06/13/2018       Recent Labs      06/13/18   1558  06/13/18   2109  06/14/18   0727  06/14/18   1114   POCGLU  139  194*  163*  241*       Blood Sugar Average: Last 72 hrs:  (P) 801 [id number]     · A1c not at goal   Continue metformin at discharge  Consider up titrating and/or adding another oral agent      · Patient may need insulin soon as an outpatient if he cannot get better glucose control with oral agents

## 2018-06-14 NOTE — ASSESSMENT & PLAN NOTE
·   Currently on aspirin 81 mg daily, metoprolol tartrate 12 5 mg p o  B I d; agreeable to restarting statin  ·  history of cardiac catheterization August 2015 after late presentation ST elevation MI,  Had PCI  ·  also had a catheterization in 10/2015 with further PCI  ·  troponins negative x3, patient denies any chest pain currently

## 2018-06-14 NOTE — DISCHARGE SUMMARY
Discharge- Thuan Agosto 1955, 58 y o  male MRN: [id number]    Unit/Bed#: -01 Encounter: [id number]    Primary Care Provider: Selina Hyatt MD   Date and time admitted to hospital: 6/12/2018  3:20 PM    * Acute on chronic combined systolic and diastolic CHF (congestive heart failure) ([facility])   Assessment & Plan    · Increasing SOB/HUNTER, B/L LE edema, poor activity tolerance  BNP elevated at 4K  CXR with small b/l pleural effusions  · ECHO from 2015 (post CABG) shows EF of 555 with Grade 2 diastolic dysfunction  · Repeat EF 11%, grade 2 diastolic dysfunction, mild valvular abnormalities  See full report for details  ·   Cardiology following, appreciate input  Patient diuresed well with IV Lasix, Cardiology recommends discharging on p o  Lasix 80 mg b i d  patient educated on importance of strict I&Os, low-sodium diet, daily weights    Dietary consult appreciated, heart failure instructions  · Oxygen successfully weaned off   · Patient reports intolerance to Ace inhibitors in the past secondary to a cough, will start him on low-dose Arb, Cozaar 25 mg daily per Cardiology recommendation  · Patient has script for repeat blood work Monday 6/18, and has appointment with heart failure Dr Abril Harris Tuesday        Cellulitis of left leg   Assessment & Plan    · L leg erythema/edema/warmth  · Treated with Keflex as an outpatient-- slightly improved  · Will continue 5 more days        Acute kidney injury superimposed on chronic kidney disease ([facility] [address] )   Assessment & Plan    · Unclear of patient's baseline-- per review of prior records his Cr appears to be anywhere between 1 7 and 1 9  ·  on admission 2 01, had initially improved to 1 78 with some diuresis but bumped again 1 98 today  ·  monitor BMP closely on Monday 6/18        CAD (coronary artery disease)   Assessment & Plan    ·   Currently on aspirin 81 mg daily, metoprolol tartrate 12 5 mg p o  B I d; agreeable to restarting statin  ·  history of cardiac catheterization August 2015 after late presentation ST elevation MI,  Had PCI  ·  also had a catheterization in 10/2015 with further PCI  ·  troponins negative x3, patient denies any chest pain currently        Hyperlipidemia   Assessment & Plan    ·  Recheck lipid panel in a chelo Lund Patient reportedly took himself off of statin as he was told his cholesterol was normal  · Discussed with patient  He is agreeable to starting a low-dose statin at discharge  · Education provided        GERD (gastroesophageal reflux disease)   Assessment & Plan    · Continue PPI daily and Tums p r n  Type 2 diabetes mellitus with complication, with long-term current use of insulin [facility])   Assessment & Plan    Lab Results   Component Value Date    HGBA1C 9 1 (H) 06/13/2018       Recent Labs      06/13/18   1558  06/13/18   2109  06/14/18   0727  06/14/18   1114   POCGLU  139  194*  163*  241*       Blood Sugar Average: Last 72 hrs:  (P) 128 [id number]     · A1c not at goal   Continue metformin at discharge  Consider up titrating and/or adding another oral agent  · Patient may need insulin soon as an outpatient if he cannot get better glucose control with oral agents         Essential hypertension   Assessment & Plan    ·  Continue Norvasc 5 mg, Lasix and metoprolol  · Low-dose Cozaar added this admission          Discharging Physician / Practitioner: Reji Torres PA-C  PCP: Beata Rodrigues MD  Admission Date:   Admission Orders     Ordered        06/12/18 1755  Inpatient Admission (expected length of stay for this patient is greater than two midnights)  Once             Discharge Date: 06/14/18    Resolved Problems  Date Reviewed: 6/14/2018    None          Consultations During Hospital Stay:  · Cardiology    Procedures Performed:     · Echocardiogram:  EF 62%, grade 2 diastolic dysfunction    See full report for details  · Abdominal ultrasound:  No ascites  · Chest x-ray:  Small bilateral pleural effusions  · Blood culture: Negative times 24 hours  · Troponin:  Negative x3  · BNP:  4974    Significant Findings / Test Results:     · See above    Incidental Findings:   · See above     Test Results Pending at Discharge (will require follow up): · None     Outpatient Tests Requested:  · Follow-up with Dr Nikita Ramsay this Tuesday as scheduled  · Follow up with PCP    Complications:  None    Reason for Admission:  Shortness of breath, weight gain and lower extremity edema    Hospital Course:     Abel Abdul is a 58 y o  male patient with past medical history significant for CAD status post stenting in 2015, hypertension, hyperlipidemia, uncontrolled diabetes, history of chronic combined systolic and diastolic heart failure who originally presented to the hospital on 6/12/2018 due to increased weight gain, leg edema and shortness of breath  Patient had been under the care of visiting nurses and was recently diagnosed with left lower extremity cellulitis  Patient reported taking Keflex  Patient also reports compliance with home Lasix 40 mg p  O  b i d  He had taken himself off of an Ace inhibitor secondary to cough and had taken himself off of his cholesterol medication secondary to normal cholesterol levels "     On admission he was found to have proBNP greater than 4900, troponins were unremarkable  Creatinine was a bit elevated  Chest x-ray showed small bilateral pleural effusions  Patient was started on IV Lasix  Cardiology was consulted and patient gradually improved  Echocardiogram was done  Patient was given IV antibiotics for his cellulitis and that also improved  Patient needed to go to [place] as his daughter was buying house  He requested to be discharged  Cardiology felt this was acceptable with starting 80 mg p  O  Lasix b i d , low-dose statin, low-dose are but  Patient will have repeat BMP on Monday 6/18 and already has a follow-up appointment with his cardiologist scheduled for Tuesday 6/19    Heart failure education was provided by nurses during this hospital stay as well as dietary  Please see above list of diagnoses and related plan for additional information  Condition at Discharge: stable     Discharge Day Visit / Exam:     * Please refer to separate progress note for these details *    Discussion with Family:  Patient    Discharge instructions/Information to patient and family:   See after visit summary for information provided to patient and family  Provisions for Follow-Up Care:  See after visit summary for information related to follow-up care and any pertinent home health orders  Disposition:     Home    For Discharges to [facility] SNF:   · Not Applicable to this Patient - Not Applicable to this Patient    Planned Readmission:  No     Discharge Statement:  I spent 45 minutes discharging the patient  This time was spent on the day of discharge  I had direct contact with the patient on the day of discharge  Greater than 50% of the total time was spent examining patient, answering all patient questions, arranging and discussing plan of care with patient as well as directly providing post-discharge instructions  Additional time then spent on discharge activities  Discharge Medications:  See after visit summary for reconciled discharge medications provided to patient and family        ** Please Note: This note has been constructed using a voice recognition system **

## 2018-06-14 NOTE — PROGRESS NOTES
General Cardiology   Progress Note -  [facility]   Leighton Wheeler 58 y o  male MRN: [id number]    Unit/Bed#: -01 Encounter: [id number]        Subjective: Pt seen for follow up  He reports feeling much better today  He was able to ambulate in halls without feeling sob; reports this is a significant improvement in his symptoms  He lost about 7lbs since admission and is overall negative 3 3L    An echocardiogram was done but not yet read  He is suppose to travel to [place] tomorrow to help his daughter with the purchase of a home; he is anxious for discharge if possible today  Review of Systems   Constitution: Positive for malaise/fatigue  Negative for decreased appetite, fever and weakness  Cardiovascular: Positive for leg swelling (improved)  Negative for chest pain, dyspnea on exertion, orthopnea and palpitations  Respiratory: Negative for cough and shortness of breath  Musculoskeletal: Positive for back pain (chronic, unchanged)  Gastrointestinal: Negative for abdominal pain, nausea and vomiting  Genitourinary: Negative for dysuria  Neurological: Negative for dizziness and light-headedness  Psychiatric/Behavioral: Negative for altered mental status  All other systems reviewed and are negative  Objective:   Vitals: Blood pressure 124/67, pulse 62, temperature 98 4 °F (36 9 °C), resp  rate 16, height 5' 8" (1 727 m), weight 83 2 kg (183 lb 6 8 oz), SpO2 96 %  ,     Body mass index is 27 89 kg/m²  ,     Systolic (78WTJ), RLB:765 , Min:122 , FHS:191     Diastolic (67IWU), PJU:37, Min:62, Max:67      Intake/Output Summary (Last 24 hours) at 06/14/18 1353  Last data filed at 06/14/18 1301   Gross per 24 hour   Intake             1500 ml   Output             4150 ml   Net            -2650 ml     Weight (last 2 days)     Date/Time   Weight    06/14/18 0358  83 2 (183 42)    06/13/18 0546  85 8 (189 15)    06/12/18 1900  87 5 (192 9)    06/12/18 1452  86 2 (190)            Telemetry Review: No significant arrhythmias seen on telemetry review  Sinus rhythm, no significant events    Physical Exam   Constitutional: He is oriented to person, place, and time  No distress  Pt chronically ill appearing, laying in bed in NAD   HENT:   Head: Normocephalic and atraumatic  Cardiovascular: Normal rate, regular rhythm, S1 normal and S2 normal     No murmur heard  Mild NP LE edema   Pulmonary/Chest: Effort normal and breath sounds normal  He has no wheezes  He has no rales  Musculoskeletal: He exhibits edema  Neurological: He is alert and oriented to person, place, and time  Skin: Skin is warm and dry  He is not diaphoretic  B/l LE ace wraps intact   Psychiatric: He has a normal mood and affect   His behavior is normal      LABORATORY RESULTS    Results from last 7 days  Lab Units 06/12/18 2327 06/12/18 2029 06/12/18  1559   TROPONIN I ng/mL <0 02 <0 02 <0 02     CBC with diff:   Results from last 7 days  Lab Units 06/13/18 0507 06/12/18 2029 06/12/18  1559   WBC Thousand/uL 4 95  --  8 07   HEMOGLOBIN g/dL 9 9*  --  11 7*   HEMATOCRIT % 30 7*  --  35 0*   MCV fL 94  --  93   PLATELETS Thousands/uL 220 232 263   MCH pg 30 3  --  31 0   MCHC g/dL 32 2  --  33 4   RDW % 14 7  --  14 7   MPV fL 10 1 9 8 10 2     CMP:  Results from last 7 days  Lab Units 06/14/18  0533 06/13/18 0507 06/12/18  1559   SODIUM mmol/L 137 142 139   POTASSIUM mmol/L 4 6 4 3 4 1   CHLORIDE mmol/L 103 107 104   CO2 mmol/L 24 25 24   ANION GAP mmol/L 10 10 11   BUN mg/dL 40* 36* 39*   CREATININE mg/dL 1 98* 1 78* 2 01*   GLUCOSE RANDOM mg/dL 150* 121 104   CALCIUM mg/dL 9 3 8 6 9 1   AST U/L  --  15 19   ALT U/L  --  24 33   ALK PHOS U/L  --  70 86   TOTAL PROTEIN g/dL  --  6 7 8 5*   BILIRUBIN TOTAL mg/dL  --  0 80 1 20*   EGFR ml/min/1 73sq m 35 39 34     BMP:  Results from last 7 days  Lab Units 06/14/18  0533 06/13/18  0507 06/12/18  1559   SODIUM mmol/L 137 142 139   POTASSIUM mmol/L 4 6 4 3 4 1   CHLORIDE mmol/L 103 107 104 CO2 mmol/L 24 25 24   BUN mg/dL 40* 36* 39*   CREATININE mg/dL 1 98* 1 78* 2 01*   GLUCOSE RANDOM mg/dL 150* 121 104   CALCIUM mg/dL 9 3 8 6 9 1     Lab Results   Component Value Date    NTBNP 4,974 (H) 2018        Results from last 7 days  Lab Units 18  0533 18  0507   MAGNESIUM mg/dL 2 5 2 4        Results from last 7 days  Lab Units 18  0507   HEMOGLOBIN A1C % 9 1*     Results from last 7 days  Lab Units 18  1559   INR  0 97     Lipid Profile:   Lab Results   Component Value Date    CHOL 141 2018    CHOL 126 2018    CHOL 72 2017     Lab Results   Component Value Date    HDL 41 2018    HDL 42 2018    HDL 43 2017     Lab Results   Component Value Date    LDLCALC 82 2018    LDLCALC 75 2018    [address] 3 2017     Lab Results   Component Value Date    TRIG 91 2018    TRIG 46 2018    TRIG 129 2017     Cardiac testing:   Results for orders placed in visit on 10/27/15   Echo complete with contrast if indicated    Narrative [place] [address] [address] [place] [address]  [phone]    Transthoracic Echocardiogram  2D, M-mode, Doppler, and Color Doppler    Study date:  30-Oct-2015    Patient: Kavin Medina  MR number: [id number]  Account number: [de-identified]  : 1955  Age: 63 years  Gender: Male  Status: Outpatient  Location: [facility]  Height: 68 in  Weight: 157 7 lb  BP: 118/ 78 mmHg    Indications: Coronary Artery Disease, Congestive heart Failure    Diagnoses: I25 10 - Atherosclerotic heart disease of native coronary artery  without angina pectoris, B81 92 - Chronic systolic (congestive) heart failure    Sonographer:  Chyna Wilson  Primary Physician:  Apolonia Gonzalez MD  Referring Physician:  Alexia Ortiz DO  Group:  [facility]  Interpreting Physician:  Sundar Bolivar DO    SUMMARY    LEFT VENTRICLE:  Systolic function was normal  Ejection fraction was estimated to be 55 %  There was hypokinesis of the basal-mid inferior, basal-mid inferolateral, and  mid anterolateral wall(s)  Wall thickness was mildly increased  There was mild concentric hypertrophy  Doppler parameters were consistent with abnormal left ventricular relaxation  (grade 1 diastolic dysfunction)  LEFT ATRIUM:  The atrium was mildly dilated  RIGHT ATRIUM:  The atrium was mildly dilated  MITRAL VALVE:  There was mild regurgitation  TRICUSPID VALVE:  There was mild regurgitation  PULMONIC VALVE:  There was trace regurgitation  IVC, HEPATIC VEINS:  The respirophasic change in diameter was less than 50%  HISTORY: PRIOR HISTORY: Coronary Artery Disease/MI 8-15/Stent 8-15/ Stent  10-15/PTCA 10-15, Hyperlipidemia, Congestive Heart Failure, Chronic Renal  Disease, DM2, Former Smoker, Family Hx  CAD    PROCEDURE: The study was performed in the [facility]  This was a routine study  The transthoracic approach was used  The study  included complete 2D imaging, M-mode, complete spectral Doppler, and color  Doppler  Image quality was adequate  LEFT VENTRICLE: Size was normal  Systolic function was normal  Ejection  fraction was estimated to be 55 %  There was hypokinesis of the basal-mid  inferior, basal-mid inferolateral, and mid anterolateral wall(s)  Wall  thickness was mildly increased  There was mild concentric hypertrophy  DOPPLER:  Doppler parameters were consistent with abnormal left ventricular relaxation  (grade 1 diastolic dysfunction)  RIGHT VENTRICLE: The size was normal  Systolic function was normal  Wall  thickness was normal     LEFT ATRIUM: The atrium was mildly dilated  RIGHT ATRIUM: The atrium was mildly dilated  MITRAL VALVE: Valve structure was normal  There was normal leaflet separation  DOPPLER: The transmitral velocity was within the normal range  There was no  evidence for stenosis   There was mild regurgitation  AORTIC VALVE: The valve was trileaflet  Leaflets exhibited normal thickness,  normal cuspal separation, and sclerosis  DOPPLER: Transaortic velocity was  within the normal range  There was no evidence for stenosis  There was no  regurgitation  TRICUSPID VALVE: The valve structure was normal  There was normal leaflet  separation  DOPPLER: The transtricuspid velocity was within the normal range  There was no evidence for stenosis  There was mild regurgitation  The tricuspid  jet envelope definition was inadequate for estimation of RV systolic pressure  There are no indirect findings (abnormal RV volume or geometry, altered  pulmonary flow velocity profile, or leftward septal displacement) which would  suggest moderate or severe pulmonary hypertension  PULMONIC VALVE: Leaflets exhibited normal thickness, no calcification, and  normal cuspal separation  DOPPLER: The transpulmonic velocity was within the  normal range  There was trace regurgitation  PERICARDIUM: There was no pericardial effusion  The pericardium was normal in  appearance  AORTA: The root exhibited normal size  SYSTEMIC VEINS: IVC: The respirophasic change in diameter was less than 50%      SYSTEM MEASUREMENT TABLES    2D  %FS: 23 12 %  AV Diam: 3 31 cm  EDV(Teich): 70 68 ml  EF(Cube): 54 56 %  EF(Teich): 46 87 %  ESV(Cube): 29 44 ml  ESV(Teich): 37 55 ml  IVSd: 1 41 cm  LA Area: 20 23 cm2  LA Diam: 3 47 cm  LVEDV MOD A4C: 132 29 ml  LVEF MOD A4C: 50 9 %  LVESV MOD A4C: 64 95 ml  LVIDd: 4 02 cm  LVIDs: 3 09 cm  LVLd A4C: 8 35 cm  LVLs A4C: 6 77 cm  LVPWd: 1 31 cm  RA Area: 20 83 cm2  RV Diam: 4 11 cm  SI(Cube): 19 11 ml/m2  SI(Teich): 17 91 ml/m2  SV MOD A4C: 67 34 ml  SV(Cube): 35 35 ml  SV(Teich): 33 13 ml    CW  TR MaxP 92 mmHg  TR Vmax: 2 63 m/s    MM  TAPSE: 2 04 cm    PW  E': 0 06 m/s  E/E': 15 88  MV A [place]ago: 1 08 m/s  MV Dec [place]: 4 24 m/s2  MV DecT: 212 83 ms  MV E Dago: 0 9 m/s  MV E/A Ratio: 0 84    [address] Accredited Echocardiography Laboratory    Prepared and electronically signed by    Caesar Waller DO  Signed 30-Oct-2015 11:06:09       Meds/Allergies   current meds:   Current Facility-Administered Medications   Medication Dose Route Frequency    amLODIPine (NORVASC) tablet 5 mg  5 mg Oral Daily    aspirin chewable tablet 81 mg  81 mg Oral Daily    calcium carbonate (TUMS) chewable tablet 1,000 mg  1,000 mg Oral Daily PRN    ceFAZolin (ANCEF) IVPB (premix) 1,000 mg  1,000 mg Intravenous Q12H    ferrous sulfate tablet 325 mg  325 mg Oral Daily With Breakfast    furosemide (LASIX) injection 80 mg  80 mg Intravenous TID (diuretic)    heparin (porcine) subcutaneous injection 5,000 Units  5,000 Units Subcutaneous Q8H [facility]    insulin lispro (HumaLOG) 100 units/mL subcutaneous injection 1-5 Units  1-5 Units Subcutaneous HS    insulin lispro (HumaLOG) 100 units/mL subcutaneous injection 1-6 Units  1-6 Units Subcutaneous TID AC    metoprolol tartrate (LOPRESSOR) partial tablet 12 5 mg  12 5 mg Oral Q12H [place]OSU[place]    ondansetron (ZOFRAN) injection 4 mg  4 mg Intravenous Q6H PRN    pantoprazole (PROTONIX) EC tablet 40 mg  40 mg Oral Early Morning    potassium chloride (K-DUR,KLOR-CON) CR tablet 20 mEq  20 mEq Oral BID    senna (SENOKOT) tablet 8 6 mg  1 tablet Oral Daily    sodium chloride (OCEAN) 0 65 % nasal spray 1 spray  1 spray Each Nare Q2H PRN     Prescriptions Prior to Admission   Medication    amLODIPine (NORVASC) 5 mg tablet    aspirin 81 MG tablet    B Complex Vitamins (VITAMIN B COMPLEX PO)    cephalexin (KEFLEX) 500 mg capsule    ferrous sulfate 325 (65 Fe) mg tablet    furosemide (LASIX) 40 mg tablet    Insulin Aspart (NOVOLOG SC)    Insulin Glargine (LANTUS SOLOSTAR) 100 UNIT/ML SOPN    metFORMIN (GLUCOPHAGE) 500 mg tablet    metoprolol tartrate (LOPRESSOR) 25 mg tablet    Multiple Vitamin (ONE-A-DAY MENS PO)    omeprazole (PriLOSEC) 20 mg delayed release capsule    potassium chloride (K-DUR,KLOR-CON) 10 mEq tablet     Assessment:  Principal Problem:    Acute on chronic combined systolic and diastolic CHF (congestive heart failure) ([facility])  Active Problems:    Essential hypertension    Type 2 diabetes mellitus with complication, with long-term current use of insulin ([facility])    Acute kidney injury superimposed on chronic kidney disease (HCC)    GERD (gastroesophageal reflux disease)    Cellulitis of left leg    Hyperlipidemia    CAD (coronary artery disease)      Assessment/ Plan:    Acute on chronic CHF: Pt symptoms and volume status improved s/p aggressive IV diuresis;   Repeat echo pending read  If echo without significant changes it is reasonable for him to be discharged today on oral diuretics, plan on 80mg BID lasix; BMP 6/18 or 6/19  He will follow up with [facility] on 6/19  Reviewed low Na diet and medications with patient  CKD stage 3: Cr increased today to 1 98; still close to baseline  Repeat BMP next week  HLD: pt took himself off statin in past, he is willing to restart; recommend atorvastatin 20mg daily    CAD: late presentation lateral STEMI 8/2015 with PCI circumflex and PCI to RCA and diagonal 10/2015  Continue ASA, statin, BB  Counseling / Coordination of Care  Total floor / unit time spent today 30 minutes  Greater than 50% of total time was spent with the patient and / or family counseling and / or coordination of care  ** Please Note: Dragon 360 Dictation voice to text software may have been used in the creation of this document   **

## 2018-06-14 NOTE — ASSESSMENT & PLAN NOTE
· L leg erythema/edema/warmth  · Treated with Keflex as an outpatient-- slightly improved  · Will continue 5 more days

## 2018-06-14 NOTE — ASSESSMENT & PLAN NOTE
· Unclear of patient's baseline-- per review of prior records his Cr appears to be anywhere between 1 7 and 1 9  ·  on admission 2 01, had initially improved to 1 78 with some diuresis but bumped again 1 98 today  ·  monitor BMP closely on Monday 6/18

## 2018-06-14 NOTE — ASSESSMENT & PLAN NOTE
·  Recheck lipid panel in a [facility] Patient reportedly took himself off of statin as he was told his cholesterol was normal  · Discussed with patient    He is agreeable to starting a low-dose statin at discharge  · Education provided

## 2018-06-14 NOTE — ASSESSMENT & PLAN NOTE
· Increasing SOB/HUNTER, B/L LE edema, poor activity tolerance  BNP elevated at 4K  CXR with small b/l pleural effusions  · ECHO from 2015 (post CABG) shows EF of 555 with Grade 2 diastolic dysfunction  · Repeat pending  ·   Cardiology following, appreciate input  Patient currently on Lasix 80 mg IV TID with diuresis of 3L ( home dose 40 mg b I d )  ·  strict I&Os, low-sodium diet, daily weights    Dietary consult pending for heart failure education  · Oxygen successfully weaned off   · Should be on ace-inhibitor given CHF but reports hx of severe cough with ace, would recommend consideration of ARB

## 2018-06-15 LAB
BACTERIA WND AEROBE CULT: ABNORMAL
BACTERIA WND AEROBE CULT: ABNORMAL
GRAM STN SPEC: ABNORMAL
GRAM STN SPEC: ABNORMAL

## 2018-06-15 NOTE — CASE MANAGEMENT
Notification of Discharge  This is a Notification of Discharge from our facility [address]  Please be advised that this patient has been discharge from our facility  Below you will find the admission and discharge date and time including the patients disposition  PRESENTATION DATE: 6/12/2018  3:20 PM  IP ADMISSION DATE: 6/12/18 1755  DISCHARGE DATE: 6/14/2018  4:56 PM  DISPOSITION: Home with [address] [place] [address] in the [employer] by Jamal Patrick for 2017  Network Utilization Review Department  Phone: [phone]; Fax [phone]  ATTENTION: The Network Utilization Review Department is now centralized for our 7 Facilities  Make a note that we have a new phone and fax numbers for our Department  Please call with any questions or concerns to [phone] and carefully follow the prompts so that you are directed to the right person  All voicemails are confidential  Fax any determinations, approvals, denials, and requests for initial or continue stay review clinical to [phone]  Due to HIGH CALL volume, it would be easier if you could please send faxed requests to expedite your requests and in part, help us provide discharge notifications faster        Reference #[id number]

## 2018-06-17 LAB
BACTERIA BLD CULT: NORMAL
BACTERIA BLD CULT: NORMAL

## 2018-06-19 ENCOUNTER — APPOINTMENT (OUTPATIENT)
Dept: LAB | Facility: CLINIC | Age: 63
End: 2018-06-19
Payer: COMMERCIAL

## 2018-06-19 ENCOUNTER — TRANSCRIBE ORDERS (OUTPATIENT)
Dept: LAB | Facility: CLINIC | Age: 63
End: 2018-06-19

## 2018-06-19 DIAGNOSIS — I50.43 ACUTE ON CHRONIC COMBINED SYSTOLIC AND DIASTOLIC CHF (CONGESTIVE HEART FAILURE) (HCC): ICD-10-CM

## 2018-06-19 DIAGNOSIS — N17.9 ACUTE KIDNEY INJURY SUPERIMPOSED ON CHRONIC KIDNEY DISEASE (HCC): Chronic | ICD-10-CM

## 2018-06-19 DIAGNOSIS — N18.9 ACUTE KIDNEY INJURY SUPERIMPOSED ON CHRONIC KIDNEY DISEASE (HCC): Chronic | ICD-10-CM

## 2018-06-19 LAB
ANION GAP SERPL CALCULATED.3IONS-SCNC: 10 MMOL/L (ref 4–13)
BUN SERPL-MCNC: 43 MG/DL (ref 5–25)
CALCIUM SERPL-MCNC: 8.8 MG/DL (ref 8.3–10.1)
CHLORIDE SERPL-SCNC: 107 MMOL/L (ref 100–108)
CO2 SERPL-SCNC: 26 MMOL/L (ref 21–32)
CREAT SERPL-MCNC: 1.96 MG/DL (ref 0.6–1.3)
GFR SERPL CREATININE-BSD FRML MDRD: 35 ML/MIN/1.73SQ M
GLUCOSE P FAST SERPL-MCNC: 105 MG/DL (ref 65–99)
POTASSIUM SERPL-SCNC: 4 MMOL/L (ref 3.5–5.3)
SODIUM SERPL-SCNC: 143 MMOL/L (ref 136–145)

## 2018-06-19 PROCEDURE — 36415 COLL VENOUS BLD VENIPUNCTURE: CPT

## 2018-06-19 PROCEDURE — 80048 BASIC METABOLIC PNL TOTAL CA: CPT

## 2018-07-03 ENCOUNTER — OFFICE VISIT (OUTPATIENT)
Dept: CARDIOLOGY CLINIC | Facility: CLINIC | Age: 63
End: 2018-07-03
Payer: COMMERCIAL

## 2018-07-03 VITALS
OXYGEN SATURATION: 98 % | SYSTOLIC BLOOD PRESSURE: 120 MMHG | HEART RATE: 58 BPM | BODY MASS INDEX: 29.04 KG/M2 | DIASTOLIC BLOOD PRESSURE: 60 MMHG | WEIGHT: 191 LBS

## 2018-07-03 DIAGNOSIS — I25.10 CORONARY ARTERY DISEASE INVOLVING NATIVE CORONARY ARTERY OF NATIVE HEART WITHOUT ANGINA PECTORIS: ICD-10-CM

## 2018-07-03 DIAGNOSIS — I10 HTN (HYPERTENSION), BENIGN: ICD-10-CM

## 2018-07-03 DIAGNOSIS — I50.32 CHRONIC DIASTOLIC CHF (CONGESTIVE HEART FAILURE), NYHA CLASS 3 (HCC): Primary | ICD-10-CM

## 2018-07-03 PROCEDURE — 99214 OFFICE O/P EST MOD 30 MIN: CPT | Performed by: INTERNAL MEDICINE

## 2018-07-03 PROCEDURE — 1111F DSCHRG MED/CURRENT MED MERGE: CPT | Performed by: INTERNAL MEDICINE

## 2018-07-03 RX ORDER — DOXYCYCLINE HYCLATE 100 MG
100 TABLET ORAL
COMMUNITY
Start: 2018-07-02 | End: 2018-07-09

## 2018-07-03 RX ORDER — METOLAZONE 5 MG/1
5 TABLET ORAL DAILY PRN
Qty: 30 TABLET | Refills: 3 | Status: SHIPPED | OUTPATIENT
Start: 2018-07-03 | End: 2019-02-08 | Stop reason: SDUPTHER

## 2018-07-03 RX ORDER — CIPROFLOXACIN 500 MG/1
500 TABLET, FILM COATED ORAL
COMMUNITY
Start: 2018-07-02 | End: 2018-07-09

## 2018-07-03 NOTE — PROGRESS NOTES
Heart Failure Outpatient Progress Note - Manuel Cast 58 y o  male MRN: [id number]    @ Encounter: [id number]      Assessment/Plan:    Patient Active Problem List    Diagnosis Date Noted    Hyperlipidemia 06/13/2018    CAD (coronary artery disease) 06/13/2018    Acute on chronic combined systolic and diastolic [facility]cong[facility] 06/12/2018    Acute kidney injury superimposed on chronic kidney disease ([facility] ) 06/12/2018    GERD (gastroesophageal reflux disease) 06/12/2018    Cellulitis of left leg 06/12/2018    Essential hypertension     Type 2 diabetes mellitus with complication, with long-term current use of insulin ([facility])      # Chronic diastolic CHF, Stage C, NYHA 2  Hospitalization June 12 for HF  Wt: 191 lbs ( 183 lbs on discharge 6/14/18)    TODAY'S PLAN:  Take metolazone 5 mg one hour before either lasix dose when weight is up 4 lbs  Please take an extra potassium pill every time you take a metolazone  --2g sodium diet  - Daily weights    Echocardiogram 6/13/18  LVEF: 55%, grade 2 DD  LVIDd:  RV:  MR:  PASP: 35  RVOT:   Other:    Neurohormonal Blockade:  --Beta-Blocker:  --ACEi, ARB or ARNi:    (or SVR reduction)  --Aldosterone Receptor Blocker:  --Diuretic: lasix 80 mg BID    # CKD- cr 1 98 on 6/19/18  # CAD, multiple  PCI 2015 for angina  [facility] Oct 2015- JOHNY 90% D1, JOHNY to 90% mid RCA, JOHNY to 90% RPDA,   # lipids 6/13/18: LDL 75, HDL 42  # Referred claudication on ambulation  KYRA 11/11/16: R 1 28, L 1 11  # HTN- amlodipine 5 mg, losartan 25 mg daily, metoprolol 50 mg twice daily  # cellulitis left leg  # hyperlipidemia  # DM2  # GERD    HPI:     Peg Hwang is a 19-year-old male who presents for follow-up after hospitalization for  Acute on chronic diastolic heart failure  He has history of coronary artery disease, hypertension, hyperlipidemia and follows with Dr Sadia Fields   He was a late presentation inferior lateral STEMI August 2015 and underwent PCI with a drug-eluting stent left circumflex  He also had PCI to the RCA and diagonal with drug-eluting stents in October 2015 for residual CAD  He has chronic systolic heart failure, ejection fraction 50%  When presenting to hospital 6/12/18 he reported 12 lb gain in 6 months and had SOB and LE edema  He had been taking lasix 40 mg BID  NT pro BNP was 4900 and had LE edema  He was discharged at 183 lbs and is 191 lbs today but is wearing about 5 lbs more  He is on currently on lasix 80 mg BID  Retains a lot of fluid in abdomen  Trying to do well with salt intake and fluid intake  Interval History:  Weight is up about 3 lbs    Past Medical History:   Diagnosis Date    Diabetes mellitus ([facility] )     Hypertension     Wound, open, foot        Review of Systems - Negative except left leg cellulitis, abdominal girth, LE edema    Allergies   Allergen Reactions    Amoxicillin Swelling    Lisinopril Cough           Current Outpatient Prescriptions:     amLODIPine (NORVASC) 5 mg tablet, Take 5 mg by mouth daily, Disp: , Rfl:     aspirin 81 MG tablet, Take by mouth, Disp: , Rfl:     atorvastatin (LIPITOR) 20 mg tablet, Take 1 tablet (20 mg total) by mouth daily, Disp: 30 tablet, Rfl: 0    B Complex Vitamins (VITAMIN B COMPLEX PO), Take by mouth, Disp: , Rfl:     ciprofloxacin (CIPRO) 500 mg tablet, Take 500 mg by mouth, Disp: , Rfl:     doxycycline hyclate (VIBRA-TABS) 100 mg tablet, Take 100 mg by mouth, Disp: , Rfl:     ferrous sulfate 325 (65 Fe) mg tablet, Take 325 mg by mouth daily with breakfast, Disp: , Rfl:     furosemide (LASIX) 80 mg tablet, Take 1 tablet (80 mg total) by mouth 2 (two) times a day, Disp: 60 tablet, Rfl: 0    Insulin Aspart (NOVOLOG SC), Inject under the skin, Disp: , Rfl:     Insulin Glargine (LANTUS SOLOSTAR) 100 UNIT/ML SOPN, Inject under the skin, Disp: , Rfl:     losartan (COZAAR) 25 mg tablet, Take 1 tablet (25 mg total) by mouth daily, Disp: 30 tablet, Rfl: 0    metFORMIN (GLUCOPHAGE) 500 mg tablet, Take 500 mg by mouth 2 (two) times a day with meals, Disp: , Rfl:     metoprolol tartrate (LOPRESSOR) 25 mg tablet, Take 50 mg by mouth 2 (two) times a day  , Disp: , Rfl:     Multiple Vitamin (ONE-A-DAY MENS PO), Take by mouth daily, Disp: , Rfl:     omeprazole (PriLOSEC) 20 mg delayed release capsule, Take 20 mg by mouth 2 (two) times a day, Disp: , Rfl:     potassium chloride (K-DUR,KLOR-CON) 10 mEq tablet, Take 20 mEq by mouth  , Disp: , Rfl:     Social History     Social History    Marital status: /Civil Union     Spouse name: N/A    Number of children: N/A    Years of education: N/A     Occupational History    Not on file  Social History Main Topics    Smoking status: Never Smoker    Smokeless tobacco: Never Used    Alcohol use No    Drug use: No    Sexual activity: Not on file     Other Topics Concern    Not on file     Social History Narrative    No narrative on file       No family history on file      Physical Exam:    Vitals:   Vitals:    07/03/18 1240   BP: 120/60   Pulse: 58   SpO2: 98%     Wt Readings from Last 3 Encounters:   07/03/18 86 6 kg (191 lb)   06/14/18 83 2 kg (183 lb 6 8 oz)   04/19/17 83 kg (183 lb)         Physical Exam:    GEN: Liana Rodriguez appears well, alert and oriented x 3, pleasant and cooperative   HEENT: pupils equal, round, and reactive to light; extraocular muscles intact  NECK: supple, no carotid bruits   HEART: regular rhythm, normal S1 and S2, no murmurs, clicks, gallops or rubs, JVP is    LUNGS: clear to auscultation bilaterally; no wheezes, rales, or rhonchi   ABDOMEN: normal bowel sounds, soft, no tenderness, no distention  EXTREMITIES: peripheral pulses normal; no clubbing, cyanosis, or edema  NEURO: no focal findings   SKIN: normal without suspicious lesions on exposed skin    Labs & Results:    Lab Results   Component Value Date    GLUCOSE 150 (H) 06/14/2018    CALCIUM 8 8 06/19/2018     06/19/2018    K 4 0 06/19/2018    CO2 26 06/19/2018    CL 107 06/19/2018    BUN 43 (H) 06/19/2018    CREATININE 1 96 (H) 06/19/2018     Lab Results   Component Value Date    WBC 4 95 06/13/2018    HGB 9 9 (L) 06/13/2018    HCT 30 7 (L) 06/13/2018    MCV 94 06/13/2018     06/13/2018     Lab Results   Component Value Date    NTBNP 4,974 (H) 06/12/2018      Lab Results   Component Value Date    CHOL 141 06/14/2018    CHOL 126 06/13/2018    CHOL 72 01/30/2017     Lab Results   Component Value Date    HDL 41 06/14/2018    HDL 42 06/13/2018    HDL 43 01/30/2017     Lab Results   Component Value Date    LDLCALC 82 06/14/2018    LDLCALC 75 06/13/2018    LDLCALC 3 01/30/2017     Lab Results   Component Value Date    TRIG 91 06/14/2018    TRIG 46 06/13/2018    TRIG 129 01/30/2017     No components found for: CHOLHDL    EKG personally reviewed by Dariusz Cervantes, DO  Counseling / Coordination of Care  Total floor / unit time spent today 25 minutes  Greater than 50% of total time was spent with the patient and / or family counseling and / or coordination of care  A description of the counseling / coordination of care: 15      Thank you for the opportunity to participate in the care of this patient    ELIZABETH TONY [address]

## 2018-07-07 NOTE — ASSESSMENT & PLAN NOTE
·  Recheck lipid panel in a   [facility]   Patient reportedly took himself off of statin as he was told his cholesterol was normal   ·   I did discuss with him that he should be on a statin given the presence of stents  · He is agreeable to starting again at discharge 0

## 2018-09-11 NOTE — PROGRESS NOTES
Heart Failure Outpatient Progress Note - Stefanie Farr 58 y o  male MRN: [id number]    @ Encounter: [id number]      Assessment/Plan:    Patient Active Problem List    Diagnosis Date Noted    Hyperlipidemia 06/13/2018    CAD (coronary artery disease) 06/13/2018    Acute on chronic combined systolic and diastolic CHF (congestive heart failure) ([facility] ) 06/12/2018    Acute kidney injury superimposed on chronic kidney disease ([facility] ) 06/12/2018    GERD (gastroesophageal reflux disease) 06/12/2018    Cellulitis of left leg 06/12/2018    Essential hypertension     Type 2 diabetes mellitus with complication, with long-term current use of insulin ([facility])      # Chronic diastolic CHF, Stage C, NYHA 2  Etiology: ICM  Hospitalization June 12 for HF  Wt: 189 lbs- stable    Echocardiogram 6/13/18  LVEF: 55%, grade 2 DD  LVIDd:  RV: normal  MR:  PASP: 35  RVOT: no notching to signify elevated PVR  Other:    Neurohormonal Blockade:  --Beta-Blocker:metoprolol tartrate 50 mg BID  --ACEi, ARB or ARNi: losartan 25 mg daily    (or SVR reduction)  --Aldosterone Receptor Blocker:  --Diuretic: lasix 80 mg BID with metolazone prn    # CKD- cr 1 98 on 6/19/18  # CAD, multiple  PCI 2015 for angina  [facility] Oct 2015- JOHNY 90% D1, JOHNY to 90% mid RCA, JOHNY to 90% RPDA,   # lipids 6/13/18: LDL 75, HDL 42  # PVD, bilateral LE ulcers starting July 2017, right foot ulcer has heeled seeing Dr Ramón Brandt at [address]  Referred claudication on ambulation, chronic leg ulcers  KYRA 11/11/16: R 1 28, L 1 11  Vascular study 8/23/18 at [facility] in [place]  TBI 0 45, 0 41  # HTN- amlodipine 5 mg, losartan 25 mg daily, metoprolol 50 mg twice daily  # cellulitis left leg  # hyperlipidemia  # DM2  # GERD    TODAY'S PLAN:  Pt is moving to [place]  Has tight abdomen and is carrying around 5 lbs of fluid  --2g sodium diet  - Daily weights    HPI:     Ragini Caballero is a 74-year-old male who presents for follow-up after hospitalization for  Acute on chronic diastolic heart failure    He has history of coronary artery disease, hypertension, hyperlipidemia and follows with Dr Eileen Vieyra  He was a late presentation inferior lateral STEMI August 2015 and underwent PCI with a drug-eluting stent left circumflex  He also had PCI to the RCA and diagonal with drug-eluting stents in October 2015 for residual CAD  He has chronic systolic heart failure, ejection fraction 50%  When presenting to hospital 6/12/18 he reported 12 lb gain in 6 months and had SOB and LE edema  He had been taking lasix 40 mg BID  NT pro BNP was 4900 and had LE edema  He was discharged at 183 lbs and is 191 lbs on follow up but was wearing about 5 lbs more  He is on currently on lasix 80 mg BID  Retains a lot of fluid in abdomen  Trying to do well with salt intake and fluid intake  Weight had been up 3 lbs on follow up  Interval History:  Last visit given instruction for prn metolazone 5 mg  Past Medical History:   Diagnosis Date    Diabetes mellitus ([facility] )     Hypertension     Wound, open, foot        Review of Systems - his legs are wrapped bilaterally  Breathing is ok, no complaints  Has claudication    Allergies   Allergen Reactions    Amoxicillin Swelling    Lisinopril Cough           Current Outpatient Prescriptions:     aspirin 81 MG tablet, Take by mouth, Disp: , Rfl:     atorvastatin (LIPITOR) 20 mg tablet, Take 1 tablet (20 mg total) by mouth daily, Disp: 30 tablet, Rfl: 0    B Complex Vitamins (VITAMIN B COMPLEX PO), Take by mouth, Disp: , Rfl:     ferrous sulfate 325 (65 Fe) mg tablet, Take 325 mg by mouth daily with breakfast, Disp: , Rfl:     furosemide (LASIX) 80 mg tablet, Take 1 tablet (80 mg total) by mouth 2 (two) times a day, Disp: 60 tablet, Rfl: 0    Insulin Aspart (NOVOLOG SC), Inject 10 Units under the skin 3 (three) times a day  , Disp: , Rfl:     Insulin Glargine (LANTUS SOLOSTAR) 100 UNIT/ML SOPN, Inject 44 Units under the skin daily  , Disp: , Rfl:     losartan (COZAAR) 25 mg tablet, Take 1 tablet (25 mg total) by mouth daily, Disp: 30 tablet, Rfl: 0    metFORMIN (GLUCOPHAGE) 500 mg tablet, Take 500 mg by mouth 2 (two) times a day with meals, Disp: , Rfl:     metolazone (ZAROXOLYN) 5 mg tablet, Take 1 tablet (5 mg total) by mouth daily as needed (weight gain of 4 lbs), Disp: 30 tablet, Rfl: 3    metoprolol tartrate (LOPRESSOR) 25 mg tablet, Take 50 mg by mouth 2 (two) times a day  , Disp: , Rfl:     Multiple Vitamin (ONE-A-DAY MENS PO), Take by mouth daily, Disp: , Rfl:     omeprazole (PriLOSEC) 20 mg delayed release capsule, Take 20 mg by mouth daily  , Disp: , Rfl:     potassium chloride (K-DUR,KLOR-CON) 10 mEq tablet, Take 20 mEq by mouth  , Disp: , Rfl:     amLODIPine (NORVASC) 5 mg tablet, Take 5 mg by mouth daily, Disp: , Rfl:     Social History     Social History    Marital status: /Civil Union     Spouse name: N/A    Number of children: N/A    Years of education: N/A     Occupational History    Not on file  Social History Main Topics    Smoking status: Never Smoker    Smokeless tobacco: Never Used    Alcohol use No    Drug use: No    Sexual activity: Not on file     Other Topics Concern    Not on file     Social History Narrative    No narrative on file       No family history on file      Physical Exam:    Vitals:   Vitals:    09/12/18 1000   BP: 132/60   Pulse: 63   SpO2: 97%     Wt Readings from Last 3 Encounters:   09/12/18 85 7 kg (189 lb)   07/03/18 86 6 kg (191 lb)   06/14/18 83 2 kg (183 lb 6 8 oz)       Physical Exam:    GEN: Cecil Mcfarland appears well, alert and oriented x 3, pleasant and cooperative   HEENT: pupils equal, round, and reactive to light; extraocular muscles intact  NECK: supple, no carotid bruits   HEART: regular rhythm, normal S1 and S2, no murmurs, clicks, gallops or rubs, JVP is down  LUNGS: clear to auscultation bilaterally; no wheezes, rales, or rhonchi   ABDOMEN: normal bowel sounds, soft, no tenderness, no distention  EXTREMITIES: peripheral pulses normal; no clubbing,bilateral legs are wrapped  NEURO: no focal findings   SKIN: normal without suspicious lesions on exposed skin    Labs & Results:    Lab Results   Component Value Date    GLUCOSE 155 (H) 11/11/2015    CALCIUM 8 8 06/19/2018     06/19/2018    K 4 0 06/19/2018    CO2 26 06/19/2018     06/19/2018    BUN 43 (H) 06/19/2018    CREATININE 1 96 (H) 06/19/2018     Lab Results   Component Value Date    WBC 4 95 06/13/2018    HGB 9 9 (L) 06/13/2018    HCT 30 7 (L) 06/13/2018    MCV 94 06/13/2018     06/13/2018     Lab Results   Component Value Date    NTBNP 4,974 (H) 06/12/2018      Lab Results   Component Value Date    CHOL 83 10/30/2015    CHOL 62 10/08/2015    CHOL 124 08/18/2015     Lab Results   Component Value Date    HDL 41 06/14/2018    HDL 42 06/13/2018    HDL 43 01/30/2017     Lab Results   Component Value Date    LDLCALC 82 06/14/2018    LDLCALC 75 06/13/2018    LDLCALC 3 01/30/2017     Lab Results   Component Value Date    TRIG 91 06/14/2018    TRIG 46 06/13/2018    TRIG 129 01/30/2017     No components found for: CHOLHDL    EKG personally reviewed by Mickie Oh DO  Counseling / Coordination of Care  Total floor / unit time spent today 25 minutes  Greater than 50% of total time was spent with the patient and / or family counseling and / or coordination of care  A description of the counseling / coordination of care: 15      Thank you for the opportunity to participate in the care of this patient    ELIZABETH TONY [address] Issa

## 2018-09-12 ENCOUNTER — OFFICE VISIT (OUTPATIENT)
Dept: CARDIOLOGY CLINIC | Facility: CLINIC | Age: 63
End: 2018-09-12
Payer: COMMERCIAL

## 2018-09-12 VITALS
BODY MASS INDEX: 28.74 KG/M2 | WEIGHT: 189 LBS | HEART RATE: 63 BPM | DIASTOLIC BLOOD PRESSURE: 60 MMHG | SYSTOLIC BLOOD PRESSURE: 132 MMHG | OXYGEN SATURATION: 97 %

## 2018-09-12 DIAGNOSIS — I25.10 CORONARY ARTERY DISEASE INVOLVING NATIVE CORONARY ARTERY OF NATIVE HEART WITHOUT ANGINA PECTORIS: ICD-10-CM

## 2018-09-12 DIAGNOSIS — I10 HTN (HYPERTENSION), BENIGN: ICD-10-CM

## 2018-09-12 DIAGNOSIS — I50.22 CHRONIC SYSTOLIC CHF (CONGESTIVE HEART FAILURE), NYHA CLASS 3 (HCC): Primary | ICD-10-CM

## 2018-09-12 PROCEDURE — 99214 OFFICE O/P EST MOD 30 MIN: CPT | Performed by: INTERNAL MEDICINE

## 2018-09-12 NOTE — PATIENT INSTRUCTIONS
Take metolazone tomorrow with extra potassium    Good luck on your move to [address] [place] [address] hooked up with cardiology when you get down there

## 2019-02-08 DIAGNOSIS — I50.32 CHRONIC DIASTOLIC CHF (CONGESTIVE HEART FAILURE), NYHA CLASS 3 (HCC): ICD-10-CM

## 2019-02-08 RX ORDER — METOLAZONE 5 MG/1
TABLET ORAL
Qty: 90 TABLET | Refills: 2 | Status: SHIPPED | OUTPATIENT
Start: 2019-02-08

## 2019-11-25 ENCOUNTER — CORNEA CONSULT (OUTPATIENT)
Dept: URBAN - METROPOLITAN AREA CLINIC 46 | Facility: CLINIC | Age: 64
End: 2019-11-25

## 2019-11-25 DIAGNOSIS — H26.491: ICD-10-CM

## 2019-11-25 DIAGNOSIS — H04.123: ICD-10-CM

## 2019-11-25 DIAGNOSIS — E11.3592: ICD-10-CM

## 2019-11-25 DIAGNOSIS — H01.003: ICD-10-CM

## 2019-11-25 DIAGNOSIS — H01.006: ICD-10-CM

## 2019-11-25 DIAGNOSIS — E11.3511: ICD-10-CM

## 2019-11-25 PROCEDURE — 99204 OFFICE O/P NEW MOD 45 MIN: CPT

## 2019-11-25 PROCEDURE — 92134 CPTRZ OPH DX IMG PST SGM RTA: CPT

## 2019-11-25 ASSESSMENT — KERATOMETRY
OS_AXISANGLE_DEGREES: 170
OS_AXISANGLE2_DEGREES: 80
OS_K1POWER_DIOPTERS: 41.5
OS_K2POWER_DIOPTERS: 42.25

## 2019-11-25 ASSESSMENT — VISUAL ACUITY
OS_CC: J2
OS_SC: 20/50
OD_SC: J8
OU_CC: J1
OU_SC: 20/40+1
OD_SC: 20/40
OS_SC: J6
OU_SC: J6
OD_CC: J2

## 2019-11-25 ASSESSMENT — TONOMETRY
OD_IOP_MMHG: 14
OS_IOP_MMHG: 16

## 2019-12-13 ENCOUNTER — SURGERY/PROCEDURE (OUTPATIENT)
Dept: URBAN - METROPOLITAN AREA SURGERY 14 | Facility: SURGERY | Age: 64
End: 2019-12-13

## 2019-12-13 DIAGNOSIS — H26.491: ICD-10-CM

## 2019-12-13 PROCEDURE — 66821 AFTER CATARACT LASER SURGERY: CPT

## 2019-12-16 ASSESSMENT — KERATOMETRY
OS_AXISANGLE_DEGREES: 170
OS_K2POWER_DIOPTERS: 42.25
OS_K1POWER_DIOPTERS: 41.5
OS_AXISANGLE2_DEGREES: 80

## 2019-12-23 ENCOUNTER — YAG POST-OP (OUTPATIENT)
Dept: URBAN - METROPOLITAN AREA CLINIC 46 | Facility: CLINIC | Age: 64
End: 2019-12-23

## 2019-12-23 DIAGNOSIS — Z98.890: ICD-10-CM

## 2019-12-23 PROCEDURE — 99024 POSTOP FOLLOW-UP VISIT: CPT

## 2019-12-23 ASSESSMENT — KERATOMETRY
OS_AXISANGLE_DEGREES: 170
OS_K1POWER_DIOPTERS: 41.5
OS_K2POWER_DIOPTERS: 42.25
OS_AXISANGLE2_DEGREES: 80

## 2019-12-23 ASSESSMENT — TONOMETRY: OD_IOP_MMHG: 17

## 2019-12-23 ASSESSMENT — VISUAL ACUITY: OS_SC: 20/50

## 2021-06-09 NOTE — PATIENT DISCUSSION
Recommend Bilateral upper lid blepharoplasty. Predeterm (discussed risks and benefits of sx. ..).   Will not raise brows.

## 2024-01-30 NOTE — PATIENT INSTRUCTIONS
Take metolazone 5 mg one hour before either lasix dose when weight is up 4 lbs  Please take an extra potassium pill every time you take a metolazone    Labs in one month    Please check daily weights and contact the heart failure program at  if you gain 3 lb in one day or 5 lb in one week  Please limit daily sodium intake to 2000 mg daily  Please limit daily fluid intake to 2000 ml daily  Bring complete list of medications to your follow up appointment  bilateral upper extremity Active ROM was WFL (within functional limits)/bilateral  lower extremity Active ROM was WFL (within functional limits)